# Patient Record
Sex: FEMALE | Race: BLACK OR AFRICAN AMERICAN | NOT HISPANIC OR LATINO | Employment: UNEMPLOYED | URBAN - METROPOLITAN AREA
[De-identification: names, ages, dates, MRNs, and addresses within clinical notes are randomized per-mention and may not be internally consistent; named-entity substitution may affect disease eponyms.]

---

## 2018-05-30 ENCOUNTER — TELEPHONE (OUTPATIENT)
Dept: DERMATOLOGY | Facility: CLINIC | Age: 43
End: 2018-05-30

## 2018-09-26 ENCOUNTER — TELEPHONE (OUTPATIENT)
Dept: DERMATOLOGY | Facility: CLINIC | Age: 43
End: 2018-09-26

## 2018-09-26 NOTE — TELEPHONE ENCOUNTER
Spoke with pt and rescheduled her appointment with Dr. Cates since Dr. thompson is out on maternity leave.

## 2018-09-26 NOTE — TELEPHONE ENCOUNTER
----- Message from Stephanie Deandre sent at 9/26/2018 11:23 AM CDT -----  Contact: pt at [phone]  Trey pt-pt is calling to rescheduled  Her appt..  Can be reached at above.

## 2018-10-25 ENCOUNTER — OFFICE VISIT (OUTPATIENT)
Dept: DERMATOLOGY | Facility: CLINIC | Age: 43
End: 2018-10-25
Payer: COMMERCIAL

## 2018-10-25 ENCOUNTER — LAB VISIT (OUTPATIENT)
Dept: LAB | Facility: HOSPITAL | Age: 43
End: 2018-10-25
Payer: COMMERCIAL

## 2018-10-25 DIAGNOSIS — Z85.72 HISTORY OF CUTANEOUS T-CELL LYMPHOMA: ICD-10-CM

## 2018-10-25 DIAGNOSIS — R21 RASH AND NONSPECIFIC SKIN ERUPTION: Primary | ICD-10-CM

## 2018-10-25 DIAGNOSIS — E04.9 GOITER: ICD-10-CM

## 2018-10-25 LAB
ALBUMIN SERPL BCP-MCNC: 3.6 G/DL
ALP SERPL-CCNC: 78 U/L
ALT SERPL W/O P-5'-P-CCNC: 7 U/L
ANION GAP SERPL CALC-SCNC: 5 MMOL/L
AST SERPL-CCNC: 13 U/L
BASOPHILS # BLD AUTO: 0.08 K/UL
BASOPHILS NFR BLD: 1.1 %
BILIRUB SERPL-MCNC: 0.5 MG/DL
BUN SERPL-MCNC: 13 MG/DL
CALCIUM SERPL-MCNC: 9.2 MG/DL
CHLORIDE SERPL-SCNC: 106 MMOL/L
CHOLEST SERPL-MCNC: 142 MG/DL
CHOLEST/HDLC SERPL: 3.6 {RATIO}
CO2 SERPL-SCNC: 28 MMOL/L
CREAT SERPL-MCNC: 0.9 MG/DL
DIFFERENTIAL METHOD: ABNORMAL
EOSINOPHIL # BLD AUTO: 0.3 K/UL
EOSINOPHIL NFR BLD: 4.5 %
ERYTHROCYTE [DISTWIDTH] IN BLOOD BY AUTOMATED COUNT: 13.1 %
EST. GFR  (AFRICAN AMERICAN): >60 ML/MIN/1.73 M^2
EST. GFR  (NON AFRICAN AMERICAN): >60 ML/MIN/1.73 M^2
GLUCOSE SERPL-MCNC: 94 MG/DL
HCT VFR BLD AUTO: 39 %
HDLC SERPL-MCNC: 39 MG/DL
HDLC SERPL: 27.5 %
HGB BLD-MCNC: 12.4 G/DL
IMM GRANULOCYTES # BLD AUTO: 0.03 K/UL
IMM GRANULOCYTES NFR BLD AUTO: 0.4 %
LDH SERPL L TO P-CCNC: 150 U/L
LDLC SERPL CALC-MCNC: 95.2 MG/DL
LYMPHOCYTES # BLD AUTO: 1.8 K/UL
LYMPHOCYTES NFR BLD: 24.2 %
MCH RBC QN AUTO: 30.7 PG
MCHC RBC AUTO-ENTMCNC: 31.8 G/DL
MCV RBC AUTO: 97 FL
MONOCYTES # BLD AUTO: 0.6 K/UL
MONOCYTES NFR BLD: 8.5 %
NEUTROPHILS # BLD AUTO: 4.5 K/UL
NEUTROPHILS NFR BLD: 61.3 %
NONHDLC SERPL-MCNC: 103 MG/DL
NRBC BLD-RTO: 0 /100 WBC
PLATELET # BLD AUTO: 311 K/UL
PMV BLD AUTO: 11.1 FL
POTASSIUM SERPL-SCNC: 4.3 MMOL/L
PROT SERPL-MCNC: 7.1 G/DL
RBC # BLD AUTO: 4.04 M/UL
SODIUM SERPL-SCNC: 139 MMOL/L
TRIGL SERPL-MCNC: 39 MG/DL
TSH SERPL DL<=0.005 MIU/L-ACNC: 0.66 UIU/ML
WBC # BLD AUTO: 7.28 K/UL

## 2018-10-25 PROCEDURE — 36415 COLL VENOUS BLD VENIPUNCTURE: CPT

## 2018-10-25 PROCEDURE — 88312 SPECIAL STAINS GROUP 1: CPT | Performed by: PATHOLOGY

## 2018-10-25 PROCEDURE — 99203 OFFICE O/P NEW LOW 30 MIN: CPT | Mod: 25,S$GLB,, | Performed by: PATHOLOGY

## 2018-10-25 PROCEDURE — 85009 MANUAL DIFF WBC COUNT B-COAT: CPT

## 2018-10-25 PROCEDURE — 83615 LACTATE (LD) (LDH) ENZYME: CPT

## 2018-10-25 PROCEDURE — 11100 PR BIOPSY OF SKIN LESION: CPT | Mod: S$GLB,,, | Performed by: PATHOLOGY

## 2018-10-25 PROCEDURE — 80053 COMPREHEN METABOLIC PANEL: CPT

## 2018-10-25 PROCEDURE — 85025 COMPLETE CBC W/AUTO DIFF WBC: CPT

## 2018-10-25 PROCEDURE — 86687 HTLV-I ANTIBODY: CPT

## 2018-10-25 PROCEDURE — 99999 PR PBB SHADOW E&M-EST. PATIENT-LVL III: CPT | Mod: PBBFAC,,, | Performed by: PATHOLOGY

## 2018-10-25 PROCEDURE — 88341 IMHCHEM/IMCYTCHM EA ADD ANTB: CPT | Mod: 26,,, | Performed by: PATHOLOGY

## 2018-10-25 PROCEDURE — 86592 SYPHILIS TEST NON-TREP QUAL: CPT

## 2018-10-25 PROCEDURE — 83520 IMMUNOASSAY QUANT NOS NONAB: CPT

## 2018-10-25 PROCEDURE — 84443 ASSAY THYROID STIM HORMONE: CPT

## 2018-10-25 PROCEDURE — 88305 TISSUE EXAM BY PATHOLOGIST: CPT | Mod: 26,,, | Performed by: PATHOLOGY

## 2018-10-25 PROCEDURE — 88305 TISSUE EXAM BY PATHOLOGIST: CPT | Performed by: PATHOLOGY

## 2018-10-25 PROCEDURE — 88312 SPECIAL STAINS GROUP 1: CPT | Mod: 26,,, | Performed by: PATHOLOGY

## 2018-10-25 PROCEDURE — 80061 LIPID PANEL: CPT

## 2018-10-25 PROCEDURE — 88342 IMHCHEM/IMCYTCHM 1ST ANTB: CPT | Mod: 26,,, | Performed by: PATHOLOGY

## 2018-10-25 NOTE — PROGRESS NOTES
"  Subjective:       Patient ID:  Jina Castro is a 43 y.o. female who presents for   Chief Complaint   Patient presents with    Spot     Back, x 1 year, spreading, no treatment      Spot  - Initial  Affected locations: back  Duration: 1 year  Signs / symptoms: spreading  Aggravated by: nothing  Relieving factors/Treatments tried: nothing    Patient with h/o "parasporiasis" since the age of 10.  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  Treated by Dr. McBurney in the past, but has not seen her in several years.  Cleared with nbUVB in 2015, but job/time commitments made continued light tx difficult to continue at the time.  Stayed clear for at least a year.  Now getting return of erythematous, scaly patches and plaques in areas that had formerly cleared with hypopigmentation.  No associated pruritus.  Interested in resuming light treatments now that she starts her job at noon.  Has not had any imaging, blood flow cytometry, labs relevant to CTCL in >3 years.  Has a multinodular goiter that has been biopsied in the past - benign.  Stable in size per patient.  She is interested in getting established with [facility] for this.    Review of Systems   Constitutional: Negative for fever, chills, weight loss, weight gain, fatigue, night sweats and malaise.   Gastrointestinal: Negative for abdominal pain.   Skin: Positive for rash. Negative for itching, daily sunscreen use, activity-related sunscreen use and recent sunburn.   Hematologic/Lymphatic: Does not bruise/bleed easily.        Objective:    Physical Exam   Constitutional: She appears well-developed and well-nourished.   Neurological: She is alert.   Skin:   Areas Examined (abnormalities noted in diagram):   Scalp / Hair Palpated and Inspected  Head / Face Inspection Performed  Neck Inspection Performed  Chest / Axilla Inspection Performed  Abdomen Inspection " Performed  Genitals / Buttocks / Groin Inspection Performed  Back Inspection Performed  RUE Inspected  LUE Inspection Performed  RLE Inspected  LLE Inspection Performed  Nails and Digits Inspection Performed  Gland Inspection Performed              Diagram Legend     Erythematous scaling macule/papule c/w actinic keratosis       Vascular papule c/w angioma      Pigmented verrucoid papule/plaque c/w seborrheic keratosis      Yellow umbilicated papule c/w sebaceous hyperplasia      Irregularly shaped tan macule c/w lentigo     1-2 mm smooth white papules consistent with Milia      Movable subcutaneous cyst with punctum c/w epidermal inclusion cyst      Subcutaneous movable cyst c/w pilar cyst      Firm pink to brown papule c/w dermatofibroma      Pedunculated fleshy papule(s) c/w skin tag(s)      Evenly pigmented macule c/w junctional nevus     Mildly variegated pigmented, slightly irregular-bordered macule c/w mildly atypical nevus      Flesh colored to evenly pigmented papule c/w intradermal nevus       Pink pearly papule/plaque c/w basal cell carcinoma      Erythematous hyperkeratotic cursted plaque c/w SCC      Surgical scar with no sign of skin cancer recurrence      Open and closed comedones      Inflammatory papules and pustules      Verrucoid papule consistent consistent with wart     Erythematous eczematous patches and plaques     Dystrophic onycholytic nail with subungual debris c/w onychomycosis     Umbilicated papule    Erythematous-base heme-crusted tan verrucoid plaque consistent with inflamed seborrheic keratosis     Erythematous Silvery Scaling Plaque c/w Psoriasis     See annotation      Assessment / Plan:      Pathology Orders:     Normal Orders This Visit    Tissue Specimen To Pathology, Dermatology     Questions:    Directional Terms:  Other(comment)    Right    Clinical information:  h/o CTCL with recurring pathches and plaques; CTCL, r/o large cell transformation; please do TCR gene rearrangement     Specific Site:  back        Rash and nonspecific skin eruption  -     Tissue Specimen To Pathology, Dermatology    Punch biopsy procedure note:  Punch biopsy performed after verbal consent obtained. Area marked and prepped with alcohol. Approximately 1cc of 1% lidocaine with epinephrine injected. 4 mm disposable punch used to remove lesion. Hemostasis obtained and biopsy site closed with 1 - 2 Prolene sutures. Wound care instructions reviewed with patient and handout given.      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  Wants to re-start this.  Has not have labs or imaging for this in >3 years.  Has not has recent biopsy to r/o large cell transformation.  Will order nbUVB.  Pending biopsy, labs, imaging will consider combination systemic tx such as soriatane, targretin.  -     CBC with auto differential and platelets; Future  -     CMP; Future  -     TSH; Future  -     RPR; Future  -     LDH; Future  -     Lipid panel; Future  -     IL - 2 receptor; Future  -     HTLV I/II Antibody; Future  -     Sezary Prep; Future  -     T-Cell Receptor Gene Rerrangement; Future  -     CT Chest Abdomen Pelvis W W/O Contrast (XPD)    Goiter  -     Ambulatory consult to Endocrinology             Follow-up in about 2 weeks (around 11/8/2018) for suture removal.

## 2018-10-25 NOTE — PATIENT INSTRUCTIONS
"Punch Biopsy Wound Care    Your doctor has performed a punch biopsy today.  A band aid and antibiotic ointment has been placed over the site.  This should remain in place for 24 hours.  It is recommended that you keep the area dry for the first 24 hours.  After 24 hours, you may remove the band aid and wash the area with warm soap and water and apply Vaseline jelly.  Many patients prefer to use Neosporin or Bacitracin ointment.  This is acceptable; however know that you can develop an allergy to this medication even if you have used it safely for years.  It is important to keep the area moist.  Letting it dry out and get air slows healing time, will worsen the scar, and make it more difficult to remove the stitches if they were placed.  Band aid is optional after first 24 hours.      If you notice increasing redness, tenderness, pain, or yellow drainage at the biopsy or surgical site, please notify your doctor.  These are signs of an infection.    If your biopsy/surgical site is bleeding, apply firm pressure for 15 minutes straight.  Repeat for another 15 minutes, if it is still bleeding.   If the surgical site continues to bleed, then please contact your doctor.      For [facility] users:   You will receive a [facility] notification after the pathologist has finished reviewing your biopsy specimen. Pathology results, however, will not be released online so you will see a "no content" message. Once your dermatologist reviews and clinically correlates your biopsy results, you will either receive a letter in the mail with the results of a phone call from your doctor's office if further explanation or treatment is warranted.       [address] [place] [address]/ [phone] FAX/ [email or web address]         "

## 2018-10-26 LAB
PATH REV BLD -IMP: NORMAL
PATH REV: NORMAL
RPR SER QL: NORMAL

## 2018-10-27 ENCOUNTER — HOSPITAL ENCOUNTER (OUTPATIENT)
Dept: RADIOLOGY | Facility: HOSPITAL | Age: 43
Discharge: HOME | End: 2018-10-27
Attending: PATHOLOGY
Payer: COMMERCIAL

## 2018-10-27 LAB — INTERLEUKIN 2 (IL-2) RECEPTOR: 520 PG/ML

## 2018-10-27 PROCEDURE — 74177 CT ABD & PELVIS W/CONTRAST: CPT | Mod: TC

## 2018-10-27 PROCEDURE — 25500020 PHARM REV CODE 255: Performed by: PATHOLOGY

## 2018-10-27 PROCEDURE — 71260 CT THORAX DX C+: CPT | Mod: 26,,, | Performed by: RADIOLOGY

## 2018-10-27 PROCEDURE — 74177 CT ABD & PELVIS W/CONTRAST: CPT | Mod: 26,,, | Performed by: RADIOLOGY

## 2018-10-27 RX ADMIN — IOHEXOL 75 ML: 350 INJECTION, SOLUTION INTRAVENOUS at 08:10

## 2018-10-29 LAB — HTLV I/II ANTIBODY, DONOR EVAL (BLOOD EVAL): NORMAL

## 2018-11-02 DIAGNOSIS — E07.9 THYROID MASS: ICD-10-CM

## 2018-11-02 DIAGNOSIS — C84.A0 CUTANEOUS T-CELL LYMPHOMA, UNSPECIFIED BODY REGION: Primary | ICD-10-CM

## 2018-11-05 ENCOUNTER — TELEPHONE (OUTPATIENT)
Dept: DERMATOLOGY | Facility: CLINIC | Age: 43
End: 2018-11-05

## 2018-11-08 ENCOUNTER — OFFICE VISIT (OUTPATIENT)
Dept: DERMATOLOGY | Facility: CLINIC | Age: 43
End: 2018-11-08
Payer: COMMERCIAL

## 2018-11-08 ENCOUNTER — LAB VISIT (OUTPATIENT)
Dept: LAB | Facility: HOSPITAL | Age: 43
End: 2018-11-08
Payer: COMMERCIAL

## 2018-11-08 DIAGNOSIS — C84.A0 CUTANEOUS T-CELL LYMPHOMA, UNSPECIFIED BODY REGION: ICD-10-CM

## 2018-11-08 DIAGNOSIS — C84.A0 CUTANEOUS T-CELL LYMPHOMA, UNSPECIFIED BODY REGION: Primary | ICD-10-CM

## 2018-11-08 PROCEDURE — 88185 FLOWCYTOMETRY/TC ADD-ON: CPT | Performed by: PATHOLOGY

## 2018-11-08 PROCEDURE — 88189 FLOWCYTOMETRY/READ 16 & >: CPT | Mod: ,,, | Performed by: PATHOLOGY

## 2018-11-08 PROCEDURE — 99212 OFFICE O/P EST SF 10 MIN: CPT | Mod: S$GLB,,, | Performed by: PATHOLOGY

## 2018-11-08 PROCEDURE — 88184 FLOWCYTOMETRY/ TC 1 MARKER: CPT | Performed by: PATHOLOGY

## 2018-11-08 NOTE — PROGRESS NOTES
"  Subjective:       Patient ID:  Jina Castro is a 43 y.o. female who presents for No chief complaint on file.    HPI  Pt here today for suture removal/biopsy results:    FINAL PATHOLOGIC DIAGNOSIS  Skin, right back, punch biopsy:  -CUTANEOUS T-CELL LYMPHOMA, consistent with  COMMENT: The clinical history along with the histological and immunohistochemical features seen are consistent  with cutaneous T-cell lymphoma. Areas of enlarged lymphocytes or large cell transformation are not seen. T-cell  gene rearrangement studies been ordered and will be reported in an addendum to follow.  Diagnosed by: Martin Almaguer  (Electronically Signed: 2018-10-31 13:45:31)  Microscopic Examination  Punch biopsy sections show a superficial perivascular and interstitial lymphohistiocytic infiltrate with admixed  melanophages. Very rare eosinophils are present. The lymphocytes overall appear small to medium in size with focal  nuclear irregularities. Many of the lymphocytes show exocytosis in a mildly spongiotic epidermis. CD3 highlights the  numerous lymphocytes present within the epidermis which extend to the mid levels of the epidermis. Many  lymphocytes are also noted along the dermoepidermal junction. The lymphocytes in the superficial dermis show CD4  to CD8 ratio of at least 10:1. The majority of the intraepidermal lymphocytes are positive for CD8. There is loss of  CD7. PAS stain is negative for fungal organisms. Stains were reviewed in conjunction with adequate positive controls.  This case was also reviewed by Dr. Allen Cates who agrees with the above diagnosis.    PMH: Patient with h/o "parasporiasis" since the age of 10.  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  Treated by Dr. McBurney in the past, but has not seen her in several years.  Cleared with nbUVB in 2015, but job/time commitments made continued light tx difficult to " continue at the time.  Stayed clear for at least a year.  Now getting return of erythematous, scaly patches and plaques in areas that had formerly cleared with hypopigmentation.  No associated pruritus.  Interested in resuming light treatments now that she starts her job at noon.  Has not had any imaging, blood flow cytometry, labs relevant to CTCL in >3 years.  Has a multinodular goiter that has been biopsied in the past - benign.  Stable in size per patient.  She is interested in getting established with [facility] for this.    CT showed:  Impression       1. Mild bilateral axillary and inguinal adenopathy.  2. Markedly enlarged heterogeneous thyroid extending below the sternoclavicular junction.  Recommend additional evaluation with thyroid ultrasound, as clinically indicated.  This report was flagged in Epic as abnormal.     Sezary prep:  Buffy coat from blood sample submitted for flow cytometric analysis   is reviewed. Granulocytes are morphologically unremarkable.  Small   mature lymphocytes are seen.  Scattered atypical mononuclear cells   are present.  Correlation with flow cytometric immunophenotyping is   required to further characterize the lymphocyte population.  Red   blood cells are morphologically unremarkable.  Platelets display   occasional large forms.     HTLVI/II Ab negative, RPR negative, LDH WNL, CBC and CMP WNL, IL-2 receptor and lipid panel unremarkable.    Will need flow cytometry on blood, TCR gene rearrangement on blood.  Put in Heme/Onc consult to evaluate adenopathy for possible sara involvement.      Review of Systems   Constitutional: Negative for fever, chills, weight loss, fatigue and malaise.   Skin: Positive for itching and rash.        Objective:    Physical Exam   Constitutional: She appears well-developed and well-nourished.   Neurological: She is alert.   Skin:   Areas Examined (abnormalities noted in diagram):   Scalp / Hair Palpated and Inspected  Head / Face  Inspection Performed  Neck Inspection Performed  Chest / Axilla Inspection Performed  Abdomen Inspection Performed  Genitals / Buttocks / Groin Inspection Performed  Back Inspection Performed  RUE Inspected  LUE Inspection Performed  RLE Inspected  LLE Inspection Performed  Nails and Digits Inspection Performed  Gland Inspection Performed              Diagram Legend     Erythematous scaling macule/papule c/w actinic keratosis       Vascular papule c/w angioma      Pigmented verrucoid papule/plaque c/w seborrheic keratosis      Yellow umbilicated papule c/w sebaceous hyperplasia      Irregularly shaped tan macule c/w lentigo     1-2 mm smooth white papules consistent with Milia      Movable subcutaneous cyst with punctum c/w epidermal inclusion cyst      Subcutaneous movable cyst c/w pilar cyst      Firm pink to brown papule c/w dermatofibroma      Pedunculated fleshy papule(s) c/w skin tag(s)      Evenly pigmented macule c/w junctional nevus     Mildly variegated pigmented, slightly irregular-bordered macule c/w mildly atypical nevus      Flesh colored to evenly pigmented papule c/w intradermal nevus       Pink pearly papule/plaque c/w basal cell carcinoma      Erythematous hyperkeratotic cursted plaque c/w SCC      Surgical scar with no sign of skin cancer recurrence      Open and closed comedones      Inflammatory papules and pustules      Verrucoid papule consistent consistent with wart     Erythematous eczematous patches and plaques     Dystrophic onycholytic nail with subungual debris c/w onychomycosis     Umbilicated papule    Erythematous-base heme-crusted tan verrucoid plaque consistent with inflamed seborrheic keratosis     Erythematous Silvery Scaling Plaque c/w Psoriasis     See annotation      Assessment / Plan:        Cutaneous T-cell lymphoma, unspecified body region  -     Flow Cytometry Analysis (Peripheral Blood); Future  - TCR gene rearrangement analysis on blood today (ordered last visit but not  performed)  - will await Heme/Onc evaluation of adenopathy/ possible FNA/biopsy to rule out sara involvement  - depending on above results, will pursue nbUVB vs systemic tx; suggest pt re-establish care with Dr. McBurney           No Follow-up on file.

## 2018-11-12 ENCOUNTER — TELEPHONE (OUTPATIENT)
Dept: HEMATOLOGY/ONCOLOGY | Facility: CLINIC | Age: 43
End: 2018-11-12

## 2018-11-12 LAB
FLOW CYTOMETRY ANTIBODIES ANALYZED - BLOOD: NORMAL
FLOW CYTOMETRY COMMENT - BLOOD: NORMAL
FLOW CYTOMETRY INTERPRETATION - BLOOD: NORMAL

## 2018-11-12 NOTE — TELEPHONE ENCOUNTER
"Onc referral for "evaluation of adenopathy to rule out sara involvement".  Scheduled for this Friday.  Pt unclear about having to see H&N surg onc for thyroid mass.  Will clarify with Dr Cates.    ==============================  ----- Message -----  From: Ebony Pa  Sent: 11/8/2018  10:43 AM  To: [facility]    Needs Advice    Reason for call: Would like to schedule appt for Cutaneous T-cell lymphoma, unspecified body region, being referred by Dr. Cates with referral in the system. 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         Communication Preference: [phone]     Additional Information:      "

## 2018-11-13 ENCOUNTER — CLINICAL SUPPORT (OUTPATIENT)
Dept: DERMATOLOGY | Facility: CLINIC | Age: 43
End: 2018-11-13
Payer: COMMERCIAL

## 2018-11-13 DIAGNOSIS — Z85.72 HISTORY OF CUTANEOUS T-CELL LYMPHOMA: ICD-10-CM

## 2018-11-13 PROCEDURE — 96900 ACTINOTHERAPY UV LIGHT: CPT | Mod: S$GLB,,, | Performed by: DERMATOLOGY

## 2018-11-14 ENCOUNTER — TELEPHONE (OUTPATIENT)
Dept: DERMATOLOGY | Facility: CLINIC | Age: 43
End: 2018-11-14

## 2018-11-14 NOTE — TELEPHONE ENCOUNTER
----- Message from Annalisa Paredes sent at 11/14/2018  9:53 AM CST -----  Contact: pt   BV- pt - Patient Returning Call from [facility]    Who Left Message for Patient: Danyell   Communication Preference: pt   Additional Information: can you please call pt at [phone]    ZACH

## 2018-11-16 ENCOUNTER — CLINICAL SUPPORT (OUTPATIENT)
Dept: DERMATOLOGY | Facility: CLINIC | Age: 43
End: 2018-11-16
Payer: COMMERCIAL

## 2018-11-16 ENCOUNTER — INITIAL CONSULT (OUTPATIENT)
Dept: HEMATOLOGY/ONCOLOGY | Facility: CLINIC | Age: 43
End: 2018-11-16
Payer: COMMERCIAL

## 2018-11-16 VITALS
SYSTOLIC BLOOD PRESSURE: 111 MMHG | WEIGHT: 168.88 LBS | TEMPERATURE: 98 F | OXYGEN SATURATION: 100 % | HEART RATE: 89 BPM | DIASTOLIC BLOOD PRESSURE: 75 MMHG | BODY MASS INDEX: 31.08 KG/M2 | HEIGHT: 62 IN | RESPIRATION RATE: 18 BRPM

## 2018-11-16 DIAGNOSIS — C84.A8 CUTANEOUS T-CELL LYMPHOMA INVOLVING LYMPH NODES OF MULTIPLE REGIONS: ICD-10-CM

## 2018-11-16 DIAGNOSIS — C84.A4 CUTANEOUS T-CELL LYMPHOMA INVOLVING LYMPH NODES OF UPPER EXTREMITY: Primary | ICD-10-CM

## 2018-11-16 DIAGNOSIS — Z85.72 HISTORY OF CUTANEOUS T-CELL LYMPHOMA: ICD-10-CM

## 2018-11-16 PROCEDURE — 96900 ACTINOTHERAPY UV LIGHT: CPT | Mod: S$GLB,,, | Performed by: DERMATOLOGY

## 2018-11-16 PROCEDURE — 99999 PR PBB SHADOW E&M-EST. PATIENT-LVL III: CPT | Mod: PBBFAC,,, | Performed by: INTERNAL MEDICINE

## 2018-11-16 PROCEDURE — 99245 OFF/OP CONSLTJ NEW/EST HI 55: CPT | Mod: S$GLB,,, | Performed by: INTERNAL MEDICINE

## 2018-11-16 NOTE — PROGRESS NOTES
"Hematology and Medical Oncology   New Patient Consult     11/16/2018  Referred by:  Dr. Allen Cates    Primary Oncologic Diagnosis:CTCL    History of Present Ilness:   Jina Castro (Jina) is a pleasant 43 y.o.female with a prolonged course of active skin CTCL. She presents today to re-establish care for her CTCL. She was previously followed at [facility], until they became out of network for her insurance.  She has been on and off PUVA therapy as her time constraints allowed.  About 1 year ago she noticed recurrance of cutaneous lesions oh her upper extremity and trunk.  Several lesion seen at the sites of tatoos with notable hypopigmentation. The lesions do not itch and they are not painful. This week she restarted twice weekly PUVA.    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 McBurney in the past, but has not seen her in several years.  Cleared with nbUVB in 2015, but job/time commitments made continued light tx difficult to continue at the time.  Stayed clear for at least a year.    --CT Chesr/Abd/Pelvis: Mildly enlarged bilateral axillary lymph nodes with abnormal morphology measuring up to 0.9 cm on the right (series 2, image 35) and 0.9 cm on the left (series 2, image 28).        PAST MEDICAL HISTORY:   History reviewed. No pertinent past medical history.    PAST SURGICAL HISTORY:   History reviewed. No pertinent surgical history.    PAST SOCIAL HISTORY:   reports that  has never smoked. She does not have any smokeless tobacco history on file.    FAMILY HISTORY:  Family History   Problem Relation Age of Onset    Melanoma Neg Hx     Psoriasis Neg Hx     Lupus Neg Hx        CURRENT MEDICATIONS:   No current outpatient medications on file.     No current facility-administered medications for this visit.  "     ALLERGIES:   Review of patient's allergies indicates:  No Known Allergies      Review of Systems:     Review of Systems   Constitutional: Negative for appetite change, chills, diaphoresis, fatigue, fever and unexpected weight change.   HENT:   Negative for hearing loss, mouth sores, nosebleeds, sore throat, trouble swallowing and voice change.    Eyes: Negative for eye problems and icterus.   Respiratory: Negative for chest tightness, cough, hemoptysis, shortness of breath and wheezing.    Cardiovascular: Negative for chest pain, leg swelling and palpitations.   Gastrointestinal: Negative for abdominal distention, abdominal pain, blood in stool, diarrhea, nausea and vomiting.   Endocrine: Negative for hot flashes.   Genitourinary: Negative for bladder incontinence, difficulty urinating, dysuria and hematuria.    Musculoskeletal: Negative for arthralgias, back pain, flank pain, gait problem, myalgias, neck pain and neck stiffness.   Skin: Positive for rash. Negative for itching and wound.        Numerous large hypopigmented plaques on upper extremity and trunk   Neurological: Negative for dizziness, extremity weakness, gait problem, headaches, numbness, seizures and speech difficulty.   Hematological: Negative for adenopathy. Does not bruise/bleed easily.   Psychiatric/Behavioral: Negative for confusion, depression and sleep disturbance. The patient is not nervous/anxious.           Physical Exam:     Vitals:    11/16/18 0805   BP: 111/75   Pulse: 89   Resp: 18   Temp: 98.3 °F (36.8 °C)     Physical Exam   Constitutional: She is oriented to person, place, and time. She appears well-developed and well-nourished. No distress.   HENT:   Head: Normocephalic and atraumatic.   Mouth/Throat: Oropharynx is clear and moist. No oropharyngeal exudate.   Eyes: Conjunctivae and EOM are normal. Pupils are equal, round, and reactive to light.   Neck: Normal range of motion. Neck supple. No JVD present. No tracheal deviation  present. No thyromegaly present.   Cardiovascular: Normal rate, regular rhythm and normal heart sounds. Exam reveals no friction rub.   No murmur heard.  Pulmonary/Chest: Effort normal and breath sounds normal. No stridor. No respiratory distress. She has no wheezes. She has no rales. She exhibits no tenderness.   Abdominal: Soft. Bowel sounds are normal. She exhibits no distension. There is no tenderness. There is no rebound and no guarding.   Musculoskeletal: Normal range of motion. She exhibits no edema, tenderness or deformity.   Lymphadenopathy:     She has no axillary adenopathy.   Neurological: She is alert and oriented to person, place, and time. She displays normal reflexes. No cranial nerve deficit or sensory deficit. She exhibits normal muscle tone. Coordination normal.   Skin: Skin is warm and dry. Capillary refill takes less than 2 seconds. No rash noted. She is not diaphoretic. No erythema. No pallor.        Psychiatric: She has a normal mood and affect. Her behavior is normal. Judgment and thought content normal.       ECOG Performance Status: (foot note - ECOG PS provided by Eastern Cooperative Oncology Group) 0 - Asymptomatic    Karnofsky Performance Score:  100%- Normal, No Complaints, No Evidence of Disease    Labs:   Lab Results   Component Value Date    WBC 7.28 10/25/2018    HGB 12.4 10/25/2018    HCT 39.0 10/25/2018     10/25/2018    CHOL 142 10/25/2018    TRIG 39 10/25/2018    HDL 39 (L) 10/25/2018    ALT 7 (L) 10/25/2018    AST 13 10/25/2018     10/25/2018    K 4.3 10/25/2018     10/25/2018    CREATININE 0.9 10/25/2018    BUN 13 10/25/2018    CO2 28 10/25/2018    TSH 0.661 10/25/2018         Imaging: Previous imaging has been reviewed   Assessment and Plan:     Ms. Castro is a 43 year old female who is here today to discuss possible treatment options for her CTCL    CTCL  --Most recent biopsy confirmed relapse, flow is negative for blood involvement  --T cell gene  re-arrangment is positive  --Multiple new large plagues are now being treated with PUVA [first treatment was on Tuesday]. We know that the likelihood of developing extracutaneous disease, exclusive of peripheral blood involvement, correlates with the extent of skin involvement. It is exceedingly rare among patients with limited patch or plaque disease, relatively uncommon among those with generalized plaque (8 percent), and most likely among patients with tumors or generalized erythroderma (30 to 42 percent)  --Final staging is pending PET CT and is based upon the involvement of the skin (T), lymph nodes (N), viscera (M), and blood (B)   --CT chest/abd/pelvis was suspicious for bilateral axial involvement given the architecture of the nodes  --Possible excisional biopsy to be determined based on PET  --If PET is negative it is okay to treat topically. If there is sara involvement we will discuss single agent therapy such as brentuximab    60 minutes were spent face to face with the patient and her  family to discuss the disease, natural history, treatment options and survival statistics. I have provided the patient with an opportunity to ask questions and have all questions answered to his satisfaction.       she will return to clinic following PET, but knows to call in the interim if symptoms change or should a problem arise.        Savanna Herrera MD  Hematology and Medical Oncology  [facility]  [facility]

## 2018-11-19 ENCOUNTER — OFFICE VISIT (OUTPATIENT)
Dept: OTOLARYNGOLOGY | Facility: CLINIC | Age: 43
End: 2018-11-19
Payer: COMMERCIAL

## 2018-11-19 VITALS
HEART RATE: 87 BPM | SYSTOLIC BLOOD PRESSURE: 105 MMHG | DIASTOLIC BLOOD PRESSURE: 71 MMHG | BODY MASS INDEX: 31.25 KG/M2 | WEIGHT: 170.88 LBS

## 2018-11-19 DIAGNOSIS — E04.2 MULTINODULAR GOITER: Primary | ICD-10-CM

## 2018-11-19 PROBLEM — C84.A8 CUTANEOUS T-CELL LYMPHOMA INVOLVING LYMPH NODES OF MULTIPLE REGIONS: Status: ACTIVE | Noted: 2018-11-19

## 2018-11-19 PROCEDURE — 99204 OFFICE O/P NEW MOD 45 MIN: CPT | Mod: S$GLB,,, | Performed by: OTOLARYNGOLOGY

## 2018-11-19 PROCEDURE — 99999 PR PBB SHADOW E&M-EST. PATIENT-LVL III: CPT | Mod: PBBFAC,,, | Performed by: OTOLARYNGOLOGY

## 2018-11-19 PROCEDURE — 3008F BODY MASS INDEX DOCD: CPT | Mod: CPTII,S$GLB,, | Performed by: OTOLARYNGOLOGY

## 2018-11-19 NOTE — PROGRESS NOTES
Chief Complaint   Patient presents with    Consult     thyroid mass         43 y.o. female presents with longstanding history of thyroid goiter. She was seen in 2016 at [facility] where she underwent an ultrasound and biopsy which demonstrated benign thyroid nodules. She denies voice change, orthopenea or dysphagia. It does not bother her, and she is not interested in surgery unless necessary at this time.    TSH 10/25/18: 0.661       Review of Systems     Constitutional: Negative for fatigue and unexpected weight change.   HENT: per HPI.  Eyes: Negative for visual disturbance.   Respiratory: Negative for shortness of breath, hemoptysis  Cardiovascular: Negative for chest pain and palpitations.   Genitourinary: Negative for dysuria and difficulty urinating.   Musculoskeletal: Negative for decreased ROM, back pain.   Skin: Negative for rash, sunburn, itching.   Neurological: Negative for dizziness and seizures.   Hematological: Negative for adenopathy. Does not bruise/bleed easily.   Psychiatric/Behavioral: Negative for agitation. The patient is not nervous/anxious.   Endocrine: Negative for rapid weight loss/weight gain, heat/cold intolerance.     History reviewed. No pertinent past medical history.    History reviewed. No pertinent surgical history.    family history is not on file.    Pt  reports that  has never smoked. She does not have any smokeless tobacco history on file.    Review of patient's allergies indicates:  No Known Allergies     Physical Exam    Vitals:    11/19/18 0905   BP: 105/71   Pulse: 87     Body mass index is 31.25 kg/m².    Physical Exam   Constitutional: She is oriented to person, place, and time. She appears well-developed and well-nourished. No distress.   HENT:   Head: Normocephalic and atraumatic.   Right Ear: Hearing, tympanic membrane, external ear and ear canal normal. Tympanic membrane mobility is normal. No middle ear effusion. No decreased hearing is noted.   Left Ear: Hearing, tympanic  membrane, external ear and ear canal normal. Tympanic membrane mobility is normal.  No middle ear effusion. No decreased hearing is noted.   Nose: Nose normal.   Mouth/Throat: Oropharynx is clear and moist.   Eyes: Conjunctivae, EOM and lids are normal. Pupils are equal, round, and reactive to light. Right eye exhibits no discharge. Left eye exhibits no discharge.   Neck: Trachea normal, normal range of motion and phonation normal. Neck supple. No tracheal tenderness present. No tracheal deviation, no edema and no erythema present. Thyromegaly present.   Cardiovascular: Normal heart sounds.   Pulmonary/Chest: Breath sounds normal. No stridor.   Abdominal: Soft.   Lymphadenopathy:     She has no cervical adenopathy.   Neurological: She is alert and oriented to person, place, and time.   Skin: Skin is warm and dry. No rash noted. She is not diaphoretic. No erythema. No pallor.   Psychiatric: She has a normal mood and affect.   Nursing note and vitals reviewed.    Studies reviewed:  US Neck 5/18/16:  Findings  The thyroid is again noted to be enlarged heterogeneous with  appearance compatible with multinodular goiter, grossly unchanged  from 2009. Numerous nodules are again noted, for reference:  Hyperechoic circumscribed nodule in the isthmus with purely cystic  components measures 1.7 x 1.4 centimeter. Circumscribed hyperechoic  nodule in the right upper pole measures 1 x 0.7 cm. Additional  similar-appearing right upper lobe nodule measures 1.1 x 1.1 cm. The  dominant predominantly hyperechoic nodule which was previously  biopsied and shown to be benign in the right lower pole measures 4-5  centimeters and demonstrates peripheral and central vascularity,  grossly unchanged as remeasured. Similar-appearing nodule adjacent to  the isthmus in the left interpolar region measures 3.7 x 2.9  centimeters containing scattered cystic changes. Additional abutting  nodule in the left interpolar region which shows greater than  50  percent cystic composition measures 4-5 cm. Additional  similar-appearing nodule more inferiorly in the lower pole on the  left measures 3.1 x 2 cm. All nodules show a thin hypoechoic halo and  are unchanged as reevaluated on prior ultrasound of 2009.    The RIGHT LOBE measures 7 x 3.1 x 2.7 cm.  The LEFT LOBE measures 6.4 x 3.4 x 3.1 cm.  The ISTHMUS measures 12 mm.    No lymphadenopathy is noted.     FNA thyroid 04/16:  These findings are consistent with a benign  thyroid nodule    Assessment     1. Multinodular goiter          Plan  In summary, Ms. Castro is a 43 year old female with longstanding history of MNG. Will obtain repeat ultrasound for surveillance and US-guided FNA as indicated. All questions were answered and she is in agreement with the plan. I will call her with the US results.

## 2018-11-20 ENCOUNTER — CLINICAL SUPPORT (OUTPATIENT)
Dept: DERMATOLOGY | Facility: CLINIC | Age: 43
End: 2018-11-20
Payer: COMMERCIAL

## 2018-11-20 DIAGNOSIS — Z85.72 HISTORY OF CUTANEOUS T-CELL LYMPHOMA: ICD-10-CM

## 2018-11-20 PROCEDURE — 96900 ACTINOTHERAPY UV LIGHT: CPT | Mod: S$GLB,,, | Performed by: DERMATOLOGY

## 2018-11-27 ENCOUNTER — HOSPITAL ENCOUNTER (OUTPATIENT)
Dept: RADIOLOGY | Facility: HOSPITAL | Age: 43
Discharge: HOME | End: 2018-11-27
Attending: INTERNAL MEDICINE
Payer: COMMERCIAL

## 2018-11-27 DIAGNOSIS — C84.A4 CUTANEOUS T-CELL LYMPHOMA INVOLVING LYMPH NODES OF UPPER EXTREMITY: ICD-10-CM

## 2018-11-27 LAB — POCT GLUCOSE: 90 MG/DL (ref 70–110)

## 2018-11-27 PROCEDURE — A9552 F18 FDG: HCPCS

## 2018-11-27 PROCEDURE — 78815 PET IMAGE W/CT SKULL-THIGH: CPT | Mod: 26,PI,, | Performed by: RADIOLOGY

## 2018-11-28 ENCOUNTER — HOSPITAL ENCOUNTER (OUTPATIENT)
Dept: RADIOLOGY | Facility: HOSPITAL | Age: 43
Discharge: HOME | End: 2018-11-28
Attending: OTOLARYNGOLOGY
Payer: COMMERCIAL

## 2018-11-28 DIAGNOSIS — E04.2 MULTINODULAR GOITER: ICD-10-CM

## 2018-11-28 PROCEDURE — 76536 US EXAM OF HEAD AND NECK: CPT | Mod: TC

## 2018-11-28 PROCEDURE — 76536 US EXAM OF HEAD AND NECK: CPT | Mod: 26,,, | Performed by: RADIOLOGY

## 2018-12-05 ENCOUNTER — CLINICAL SUPPORT (OUTPATIENT)
Dept: DERMATOLOGY | Facility: CLINIC | Age: 43
End: 2018-12-05
Payer: COMMERCIAL

## 2018-12-05 DIAGNOSIS — Z85.72 HISTORY OF CUTANEOUS T-CELL LYMPHOMA: ICD-10-CM

## 2018-12-05 PROCEDURE — 96900 ACTINOTHERAPY UV LIGHT: CPT | Mod: S$GLB,,, | Performed by: DERMATOLOGY

## 2018-12-07 ENCOUNTER — CLINICAL SUPPORT (OUTPATIENT)
Dept: DERMATOLOGY | Facility: CLINIC | Age: 43
End: 2018-12-07
Payer: COMMERCIAL

## 2018-12-07 DIAGNOSIS — Z85.72 HISTORY OF CUTANEOUS T-CELL LYMPHOMA: ICD-10-CM

## 2018-12-07 PROCEDURE — 96900 ACTINOTHERAPY UV LIGHT: CPT | Mod: S$GLB,,, | Performed by: DERMATOLOGY

## 2018-12-11 ENCOUNTER — CLINICAL SUPPORT (OUTPATIENT)
Dept: DERMATOLOGY | Facility: CLINIC | Age: 43
End: 2018-12-11
Payer: COMMERCIAL

## 2018-12-11 DIAGNOSIS — E04.2 MULTIPLE THYROID NODULES: Primary | ICD-10-CM

## 2018-12-11 DIAGNOSIS — Z85.72 HISTORY OF CUTANEOUS T-CELL LYMPHOMA: ICD-10-CM

## 2018-12-11 PROCEDURE — 96900 ACTINOTHERAPY UV LIGHT: CPT | Mod: S$GLB,,, | Performed by: DERMATOLOGY

## 2018-12-14 ENCOUNTER — CLINICAL SUPPORT (OUTPATIENT)
Dept: DERMATOLOGY | Facility: CLINIC | Age: 43
End: 2018-12-14
Payer: COMMERCIAL

## 2018-12-14 ENCOUNTER — TELEPHONE (OUTPATIENT)
Dept: HEMATOLOGY/ONCOLOGY | Facility: CLINIC | Age: 43
End: 2018-12-14

## 2018-12-14 DIAGNOSIS — Z85.72 HISTORY OF CUTANEOUS T-CELL LYMPHOMA: ICD-10-CM

## 2018-12-14 PROCEDURE — 96900 ACTINOTHERAPY UV LIGHT: CPT | Mod: S$GLB,,, | Performed by: DERMATOLOGY

## 2018-12-14 NOTE — TELEPHONE ENCOUNTER
----- Message from Savanna Herrera MD sent at 12/14/2018  2:17 PM CST -----  Contact: Pt  She should be able to see it online.     See if she can come in sooner than mid January to discuss the results      ----- Message -----  From: Eleonora Ruff RN  Sent: 12/14/2018  10:48 AM  To: Savanna Herrera MD    She would like PET scan result  ----- Message -----  From: Araseli Pan  Sent: 12/14/2018  10:33 AM  To: [facility]ral[facility] Staff    Pt called to speak with nurse to get PET scan results   Callback#[phone]  Thank You  JUAQUIN Pan      Appointment made for 12/17

## 2018-12-17 ENCOUNTER — OFFICE VISIT (OUTPATIENT)
Dept: HEMATOLOGY/ONCOLOGY | Facility: CLINIC | Age: 43
End: 2018-12-17
Payer: COMMERCIAL

## 2018-12-17 VITALS
TEMPERATURE: 99 F | SYSTOLIC BLOOD PRESSURE: 117 MMHG | HEART RATE: 84 BPM | HEIGHT: 62 IN | WEIGHT: 173.31 LBS | DIASTOLIC BLOOD PRESSURE: 74 MMHG | BODY MASS INDEX: 31.89 KG/M2

## 2018-12-17 DIAGNOSIS — C84.A4 CUTANEOUS T-CELL LYMPHOMA INVOLVING LYMPH NODES OF UPPER EXTREMITY: Primary | ICD-10-CM

## 2018-12-17 DIAGNOSIS — C84.A8 CUTANEOUS T-CELL LYMPHOMA INVOLVING LYMPH NODES OF MULTIPLE REGIONS: ICD-10-CM

## 2018-12-17 PROCEDURE — 99999 PR PBB SHADOW E&M-EST. PATIENT-LVL III: CPT | Mod: PBBFAC,,, | Performed by: INTERNAL MEDICINE

## 2018-12-17 PROCEDURE — 3008F BODY MASS INDEX DOCD: CPT | Mod: CPTII,S$GLB,, | Performed by: INTERNAL MEDICINE

## 2018-12-17 PROCEDURE — 99215 OFFICE O/P EST HI 40 MIN: CPT | Mod: S$GLB,,, | Performed by: INTERNAL MEDICINE

## 2018-12-17 NOTE — PROGRESS NOTES
"Hematology and Medical Oncology   Follow Up     12/17/2018    Primary Oncologic Diagnosis:CTCL    History of Present Ilness:   Jina Castro (Jina) is a pleasant 43 y.o.female with a prolonged course of active skin CTCL. She presents today to re-establish care for her CTCL. She was previously followed at [facility], until they became out of network for her insurance.  She has been on and off PUVA therapy as her time constraints allowed.  About 1 year ago she noticed recurrance of cutaneous lesions oh her upper extremity and trunk.  Several lesion seen at the sites of tatoos with notable hypopigmentation. The lesions do not itch and they are not painful. This week she restarted twice weekly PUVA.    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 McBurney in the past, but has not seen her in several years.  Cleared with nbUVB in 2015, but job/time commitments made continued light tx difficult to continue at the time.  Stayed clear for at least a year.    --CT Chesr/Abd/Pelvis: Mildly enlarged bilateral axillary lymph nodes with abnormal morphology measuring up to 0.9 cm on the right (series 2, image 35) and 0.9 cm on the left (series 2, image 28).    --PET on 11/27/18 There are axillary hypermetabolic skin lesions.  There are mildly hypermetabolic axillary and inguinal lymph nodes.  Index right axillary skin lesion SUV max 3.15.  Index left axillary skin lesion SUV max 2.08.  Index right axillary lymph node SUV max 1.5.  Index left inguinal lymph node SUV max 1.61.  Multinodular substernal goiter with hypermetabolic nodules.  Index right SUV max 5.42.  Index left SUV max 3.69.  FNA may be warranted.  Hypermetabolic thyroid nodules have up to a 40% chance of being malignant.    Interval History:  Ms. Castro is doing well with PUVA " twice weekly. The lesions do not itch and she feels as though they are getting better.      PAST MEDICAL HISTORY:   No past medical history on file.    PAST SURGICAL HISTORY:   No past surgical history on file.    PAST SOCIAL HISTORY:   reports that  has never smoked. She does not have any smokeless tobacco history on file.    FAMILY HISTORY:  Family History   Problem Relation Age of Onset    Melanoma Neg Hx     Psoriasis Neg Hx     Lupus Neg Hx        CURRENT MEDICATIONS:   No current outpatient medications on file.     No current facility-administered medications for this visit.      ALLERGIES:   Review of patient's allergies indicates:  No Known Allergies      Review of Systems:     Review of Systems   Constitutional: Negative for appetite change, chills, diaphoresis, fatigue, fever and unexpected weight change.   HENT:   Negative for hearing loss, mouth sores, nosebleeds, sore throat, trouble swallowing and voice change.    Eyes: Negative for eye problems and icterus.   Respiratory: Negative for chest tightness, cough, hemoptysis, shortness of breath and wheezing.    Cardiovascular: Negative for chest pain, leg swelling and palpitations.   Gastrointestinal: Negative for abdominal distention, abdominal pain, blood in stool, diarrhea, nausea and vomiting.   Endocrine: Negative for hot flashes.   Genitourinary: Negative for bladder incontinence, difficulty urinating, dysuria and hematuria.    Musculoskeletal: Negative for arthralgias, back pain, flank pain, gait problem, myalgias, neck pain and neck stiffness.   Skin: Positive for rash. Negative for itching and wound.        Numerous large hypopigmented plaques on upper extremity and trunk   Neurological: Negative for dizziness, extremity weakness, gait problem, headaches, numbness, seizures and speech difficulty.   Hematological: Negative for adenopathy. Does not bruise/bleed easily.   Psychiatric/Behavioral: Negative for confusion, depression and sleep  disturbance. The patient is not nervous/anxious.           Physical Exam:     Vitals:    12/17/18 1625   BP: 117/74   Pulse: 84   Temp: 98.8 °F (37.1 °C)       Physical Exam   Constitutional: She is oriented to person, place, and time. She appears well-developed and well-nourished. No distress.   HENT:   Head: Normocephalic and atraumatic.   Mouth/Throat: Oropharynx is clear and moist. No oropharyngeal exudate.   Eyes: Conjunctivae and EOM are normal. Pupils are equal, round, and reactive to light.   Neck: Normal range of motion. Neck supple. No JVD present. No tracheal deviation present. No thyromegaly present.   Cardiovascular: Normal rate, regular rhythm and normal heart sounds. Exam reveals no friction rub.   No murmur heard.  Pulmonary/Chest: Effort normal and breath sounds normal. No stridor. No respiratory distress. She has no wheezes. She has no rales. She exhibits no tenderness.   Abdominal: Soft. Bowel sounds are normal. She exhibits no distension. There is no tenderness. There is no rebound and no guarding.   Musculoskeletal: Normal range of motion. She exhibits no edema, tenderness or deformity.   Lymphadenopathy:     She has no axillary adenopathy.   Neurological: She is alert and oriented to person, place, and time. She displays normal reflexes. No cranial nerve deficit or sensory deficit. She exhibits normal muscle tone. Coordination normal.   Skin: Skin is warm and dry. Capillary refill takes less than 2 seconds. No rash noted. She is not diaphoretic. No erythema. No pallor.        Psychiatric: She has a normal mood and affect. Her behavior is normal. Judgment and thought content normal.       ECOG Performance Status: (foot note - ECOG PS provided by Eastern Cooperative Oncology Group) 0 - Asymptomatic    Karnofsky Performance Score:  100%- Normal, No Complaints, No Evidence of Disease    Labs:   Lab Results   Component Value Date    WBC 7.28 10/25/2018    HGB 12.4 10/25/2018    HCT 39.0 10/25/2018      10/25/2018    CHOL 142 10/25/2018    TRIG 39 10/25/2018    HDL 39 (L) 10/25/2018    ALT 7 (L) 10/25/2018    AST 13 10/25/2018     10/25/2018    K 4.3 10/25/2018     10/25/2018    CREATININE 0.9 10/25/2018    BUN 13 10/25/2018    CO2 28 10/25/2018    TSH 0.661 10/25/2018         Imaging: Previous imaging has been reviewed   Assessment and Plan:     Ms. Castro is a 43 year old female who is here today to discuss possible treatment options for her CTCL    CTCL  --Most recent biopsy confirmed relapse, flow is negative for blood involvement  --T cell gene re-arrangment is positive  --Multiple new large plagues are now being treated with PUVA twice weekly. We know that the likelihood of developing extracutaneous disease, exclusive of peripheral blood involvement, correlates with the extent of skin involvement. It is exceedingly rare among patients with limited patch or plaque disease, relatively uncommon among those with generalized plaque (8 percent), and most likely among patients with tumors or generalized erythroderma (30 to 42 percent)  --Final staging is pending PET CT and is based upon the involvement of the skin (T), lymph nodes (N), viscera (M), and blood (B)   --CT chest/abd/pelvis was suspicious for bilateral axial involvement given the architecture of the nodes  --Possible excisional biopsy to be determined based on PET  --PET shows low level of activity, but without circulating CTCL cells we are okay to continue to treat topically  --In the future If there is sara involvement we will discuss single agent therapy such as brentuximab    30 minutes were spent face to face with the patient and her  family to discuss the disease, natural history, treatment options and survival statistics. I have provided the patient with an opportunity to ask questions and have all questions answered to his satisfaction.       she will return to clinic in 3 months with labs, but knows to call in the interim  if symptoms change or should a problem arise.        Savanna Herrera MD  Hematology and Medical Oncology  Bone Marrow Transplant  [facility]

## 2018-12-18 ENCOUNTER — TELEPHONE (OUTPATIENT)
Dept: OTOLARYNGOLOGY | Facility: CLINIC | Age: 43
End: 2018-12-18

## 2018-12-18 ENCOUNTER — CLINICAL SUPPORT (OUTPATIENT)
Dept: DERMATOLOGY | Facility: CLINIC | Age: 43
End: 2018-12-18
Payer: COMMERCIAL

## 2018-12-18 DIAGNOSIS — Z85.72 HISTORY OF CUTANEOUS T-CELL LYMPHOMA: ICD-10-CM

## 2018-12-18 PROCEDURE — 96900 ACTINOTHERAPY UV LIGHT: CPT | Mod: S$GLB,,, | Performed by: DERMATOLOGY

## 2018-12-21 ENCOUNTER — TELEPHONE (OUTPATIENT)
Dept: OTOLARYNGOLOGY | Facility: CLINIC | Age: 43
End: 2018-12-21

## 2018-12-21 NOTE — TELEPHONE ENCOUNTER
----- Message from Jordana Banks sent at 12/21/2018  2:41 PM CST -----  Contact: Patient   Needs Advice    Reason for call: Patient is calling to speak to nurse regarding upcoming biopsy. Patient would like to know what  US biopsy consists of.         Communication Preference: [phone]

## 2018-12-28 ENCOUNTER — CLINICAL SUPPORT (OUTPATIENT)
Dept: DERMATOLOGY | Facility: CLINIC | Age: 43
End: 2018-12-28
Payer: COMMERCIAL

## 2018-12-28 ENCOUNTER — HOSPITAL ENCOUNTER (OUTPATIENT)
Dept: RADIOLOGY | Facility: HOSPITAL | Age: 43
Discharge: HOME | End: 2018-12-28
Attending: OTOLARYNGOLOGY
Payer: COMMERCIAL

## 2018-12-28 ENCOUNTER — HOSPITAL ENCOUNTER (OUTPATIENT)
Dept: INTERVENTIONAL RADIOLOGY/VASCULAR | Facility: HOSPITAL | Age: 43
Discharge: HOME | End: 2018-12-28
Attending: OTOLARYNGOLOGY
Payer: COMMERCIAL

## 2018-12-28 DIAGNOSIS — E04.2 MULTIPLE THYROID NODULES: ICD-10-CM

## 2018-12-28 DIAGNOSIS — Z85.72 HISTORY OF CUTANEOUS T-CELL LYMPHOMA: ICD-10-CM

## 2018-12-28 PROCEDURE — 10022 US FINE NEEDLE ASPIRATION WITH IMAGING: CPT

## 2018-12-28 PROCEDURE — 76942 ECHO GUIDE FOR BIOPSY: CPT | Mod: 26,,, | Performed by: RADIOLOGY

## 2018-12-28 PROCEDURE — 88173 CYTOPATH EVAL FNA REPORT: CPT | Mod: 26,,, | Performed by: PATHOLOGY

## 2018-12-28 PROCEDURE — 10022 US FINE NEEDLE ASPIRATION WITH IMAGING: CPT | Mod: RT,,, | Performed by: RADIOLOGY

## 2018-12-28 PROCEDURE — 88173 CYTOPATH EVAL FNA REPORT: CPT | Performed by: PATHOLOGY

## 2018-12-28 PROCEDURE — 96900 ACTINOTHERAPY UV LIGHT: CPT | Mod: S$GLB,,, | Performed by: DERMATOLOGY

## 2018-12-28 NOTE — H&P
Radiology History & Physical      SUBJECTIVE:     Chief Complaint: Bilateral thyroid lobe nodules.     History of Present Illness:  Jina Castro is a 43 y.o. female who presents for FNA of Bilateral thyroid lobe nodules.    No past medical history on file.  No past surgical history on file.    Home Meds:   Prior to Admission medications    Not on File     Anticoagulants/Antiplatelets: no anticoagulation    Allergies: Review of patient's allergies indicates:  No Known Allergies  Sedation History:  no adverse reactions    Review of Systems:   Hematological: no known coagulopathies  Respiratory: no shortness of breath  Cardiovascular: no chest pain  Gastrointestinal: no abdominal pain  Genito-Urinary: no dysuria  Musculoskeletal: negative  Neurological: no TIA or stroke symptoms         OBJECTIVE:     Vital Signs (Most Recent)       Physical Exam:  ASA: 2  Mallampati: 2    General: no acute distress  Mental Status: alert and oriented to person, place and time  HEENT: normocephalic, atraumatic  Chest: unlabored breathing  Abdomen: nondistended  Extremity: moves all extremities    Laboratory  No results found for: INR    Lab Results   Component Value Date    WBC 7.28 10/25/2018    HGB 12.4 10/25/2018    HCT 39.0 10/25/2018    MCV 97 10/25/2018     10/25/2018      Lab Results   Component Value Date    GLU 94 10/25/2018     10/25/2018    K 4.3 10/25/2018     10/25/2018    CO2 28 10/25/2018    BUN 13 10/25/2018    CREATININE 0.9 10/25/2018    CALCIUM 9.2 10/25/2018    ALT 7 (L) 10/25/2018    AST 13 10/25/2018    ALBUMIN 3.6 10/25/2018    BILITOT 0.5 10/25/2018       ASSESSMENT/PLAN:     Sedation Plan: Local.   Patient will undergo FNA of nodules in Bilateral thyroid lobes.    Derik Khanna MD  PGY - 3  Department of Radiology

## 2018-12-28 NOTE — PROCEDURES
Radiology Post-Procedure Note    Pre Op Diagnosis: Bilateral thyroid lobe nodules.   Post Op Diagnosis: Same    Procedure: Ultrasound guided fine needle aspiration of bilateral thyroid nodules.    Procedure performed by: Kodak Fraser and Derik Khanna     Written Informed Consent Obtained: Yes  Specimen Removed: YES  - 4 passes through each nodule.   Estimated Blood Loss: Minimal    Findings:   Successful FNA of nodules in Bilateral thyroid lobes.     Patient tolerated procedure well.    Derik Khanna MD  PGY - 3  Department of Radiology

## 2019-01-03 ENCOUNTER — CLINICAL SUPPORT (OUTPATIENT)
Dept: DERMATOLOGY | Facility: CLINIC | Age: 44
End: 2019-01-03
Payer: COMMERCIAL

## 2019-01-03 DIAGNOSIS — Z85.72 HISTORY OF CUTANEOUS T-CELL LYMPHOMA: ICD-10-CM

## 2019-01-03 PROCEDURE — 96900 ACTINOTHERAPY UV LIGHT: CPT | Mod: S$GLB,,, | Performed by: DERMATOLOGY

## 2019-01-03 PROCEDURE — 96900 PR ULTRAVIOLET LIGHT THERAPY: ICD-10-PCS | Mod: S$GLB,,, | Performed by: DERMATOLOGY

## 2019-01-07 ENCOUNTER — CLINICAL SUPPORT (OUTPATIENT)
Dept: DERMATOLOGY | Facility: CLINIC | Age: 44
End: 2019-01-07
Payer: COMMERCIAL

## 2019-01-07 ENCOUNTER — TELEPHONE (OUTPATIENT)
Dept: OTOLARYNGOLOGY | Facility: CLINIC | Age: 44
End: 2019-01-07

## 2019-01-07 DIAGNOSIS — Z85.72 HISTORY OF CUTANEOUS T-CELL LYMPHOMA: ICD-10-CM

## 2019-01-07 PROCEDURE — 96900 PR ULTRAVIOLET LIGHT THERAPY: ICD-10-PCS | Mod: S$GLB,,, | Performed by: DERMATOLOGY

## 2019-01-07 PROCEDURE — 96900 ACTINOTHERAPY UV LIGHT: CPT | Mod: S$GLB,,, | Performed by: DERMATOLOGY

## 2019-01-07 NOTE — TELEPHONE ENCOUNTER
----- Message from Jordana Banks sent at 1/7/2019 10:48 AM CST -----  Contact: Patient   Test Results    Type of Test: Ultrasound Biopsy    Date of Test: 12/28    Communication Preference: [phone]

## 2019-01-08 ENCOUNTER — OFFICE VISIT (OUTPATIENT)
Dept: DERMATOLOGY | Facility: CLINIC | Age: 44
End: 2019-01-08
Payer: COMMERCIAL

## 2019-01-08 DIAGNOSIS — C84.00 MYCOSIS FUNGOIDES, UNSPECIFIED BODY REGION: Primary | ICD-10-CM

## 2019-01-08 PROCEDURE — 99213 OFFICE O/P EST LOW 20 MIN: CPT | Mod: S$GLB,,, | Performed by: PATHOLOGY

## 2019-01-08 PROCEDURE — 99213 PR OFFICE/OUTPT VISIT, EST, LEVL III, 20-29 MIN: ICD-10-PCS | Mod: S$GLB,,, | Performed by: PATHOLOGY

## 2019-01-08 PROCEDURE — 99999 PR PBB SHADOW E&M-EST. PATIENT-LVL II: CPT | Mod: PBBFAC,,, | Performed by: PATHOLOGY

## 2019-01-08 PROCEDURE — 99999 PR PBB SHADOW E&M-EST. PATIENT-LVL II: ICD-10-PCS | Mod: PBBFAC,,, | Performed by: PATHOLOGY

## 2019-01-15 ENCOUNTER — CLINICAL SUPPORT (OUTPATIENT)
Dept: DERMATOLOGY | Facility: CLINIC | Age: 44
End: 2019-01-15
Payer: COMMERCIAL

## 2019-01-15 DIAGNOSIS — Z85.72 HISTORY OF CUTANEOUS T-CELL LYMPHOMA: ICD-10-CM

## 2019-01-15 PROCEDURE — 96900 PR ULTRAVIOLET LIGHT THERAPY: ICD-10-PCS | Mod: S$GLB,,, | Performed by: DERMATOLOGY

## 2019-01-15 PROCEDURE — 96900 ACTINOTHERAPY UV LIGHT: CPT | Mod: S$GLB,,, | Performed by: DERMATOLOGY

## 2019-01-17 ENCOUNTER — CLINICAL SUPPORT (OUTPATIENT)
Dept: DERMATOLOGY | Facility: CLINIC | Age: 44
End: 2019-01-17
Payer: COMMERCIAL

## 2019-01-17 DIAGNOSIS — Z85.72 HISTORY OF CUTANEOUS T-CELL LYMPHOMA: ICD-10-CM

## 2019-01-17 PROCEDURE — 96900 ACTINOTHERAPY UV LIGHT: CPT | Mod: S$GLB,,, | Performed by: DERMATOLOGY

## 2019-01-17 PROCEDURE — 96900 PR ULTRAVIOLET LIGHT THERAPY: ICD-10-PCS | Mod: S$GLB,,, | Performed by: DERMATOLOGY

## 2019-01-17 NOTE — PROGRESS NOTES
Light tx 14 for CTCL. No erythema with last dose. NB-UVB to body at 800 mj with eye shielded. Seem by Dr McBurney foe evaluation. Requested she continue light for 3 more months,

## 2019-01-22 ENCOUNTER — CLINICAL SUPPORT (OUTPATIENT)
Dept: DERMATOLOGY | Facility: CLINIC | Age: 44
End: 2019-01-22
Payer: COMMERCIAL

## 2019-01-22 DIAGNOSIS — Z85.72 HISTORY OF CUTANEOUS T-CELL LYMPHOMA: ICD-10-CM

## 2019-01-22 PROCEDURE — 96900 ACTINOTHERAPY UV LIGHT: CPT | Mod: S$GLB,,, | Performed by: DERMATOLOGY

## 2019-01-22 PROCEDURE — 96900 PR ULTRAVIOLET LIGHT THERAPY: ICD-10-PCS | Mod: S$GLB,,, | Performed by: DERMATOLOGY

## 2019-01-24 ENCOUNTER — CLINICAL SUPPORT (OUTPATIENT)
Dept: DERMATOLOGY | Facility: CLINIC | Age: 44
End: 2019-01-24
Payer: COMMERCIAL

## 2019-01-24 DIAGNOSIS — Z85.72 HISTORY OF CUTANEOUS T-CELL LYMPHOMA: ICD-10-CM

## 2019-01-24 PROCEDURE — 96900 ACTINOTHERAPY UV LIGHT: CPT | Mod: S$GLB,,, | Performed by: DERMATOLOGY

## 2019-01-24 PROCEDURE — 96900 PR ULTRAVIOLET LIGHT THERAPY: ICD-10-PCS | Mod: S$GLB,,, | Performed by: DERMATOLOGY

## 2019-01-29 ENCOUNTER — CLINICAL SUPPORT (OUTPATIENT)
Dept: DERMATOLOGY | Facility: CLINIC | Age: 44
End: 2019-01-29
Payer: COMMERCIAL

## 2019-01-29 DIAGNOSIS — Z85.72 HISTORY OF CUTANEOUS T-CELL LYMPHOMA: ICD-10-CM

## 2019-01-29 PROCEDURE — 96900 PR ULTRAVIOLET LIGHT THERAPY: ICD-10-PCS | Mod: S$GLB,,, | Performed by: DERMATOLOGY

## 2019-01-29 PROCEDURE — 96900 ACTINOTHERAPY UV LIGHT: CPT | Mod: S$GLB,,, | Performed by: DERMATOLOGY

## 2019-01-31 ENCOUNTER — CLINICAL SUPPORT (OUTPATIENT)
Dept: DERMATOLOGY | Facility: CLINIC | Age: 44
End: 2019-01-31
Payer: COMMERCIAL

## 2019-01-31 DIAGNOSIS — Z85.72 HISTORY OF CUTANEOUS T-CELL LYMPHOMA: ICD-10-CM

## 2019-01-31 PROCEDURE — 96900 ACTINOTHERAPY UV LIGHT: CPT | Mod: S$GLB,,, | Performed by: DERMATOLOGY

## 2019-01-31 PROCEDURE — 96900 PR ULTRAVIOLET LIGHT THERAPY: ICD-10-PCS | Mod: S$GLB,,, | Performed by: DERMATOLOGY

## 2019-02-05 ENCOUNTER — CLINICAL SUPPORT (OUTPATIENT)
Dept: DERMATOLOGY | Facility: CLINIC | Age: 44
End: 2019-02-05
Payer: COMMERCIAL

## 2019-02-05 DIAGNOSIS — Z85.72 HISTORY OF CUTANEOUS T-CELL LYMPHOMA: ICD-10-CM

## 2019-02-05 PROCEDURE — 99499 UNLISTED E&M SERVICE: CPT | Mod: S$GLB,,, | Performed by: DERMATOLOGY

## 2019-02-05 PROCEDURE — 99499 NO LOS: ICD-10-PCS | Mod: S$GLB,,, | Performed by: DERMATOLOGY

## 2019-02-08 ENCOUNTER — CLINICAL SUPPORT (OUTPATIENT)
Dept: DERMATOLOGY | Facility: CLINIC | Age: 44
End: 2019-02-08
Payer: COMMERCIAL

## 2019-02-08 DIAGNOSIS — Z85.72 HISTORY OF CUTANEOUS T-CELL LYMPHOMA: ICD-10-CM

## 2019-02-08 PROCEDURE — 99499 UNLISTED E&M SERVICE: CPT | Mod: S$GLB,,, | Performed by: DERMATOLOGY

## 2019-02-08 PROCEDURE — 99499 NO LOS: ICD-10-PCS | Mod: S$GLB,,, | Performed by: DERMATOLOGY

## 2019-02-11 ENCOUNTER — CLINICAL SUPPORT (OUTPATIENT)
Dept: DERMATOLOGY | Facility: CLINIC | Age: 44
End: 2019-02-11
Payer: COMMERCIAL

## 2019-02-11 DIAGNOSIS — Z85.72 HISTORY OF CUTANEOUS T-CELL LYMPHOMA: ICD-10-CM

## 2019-02-11 PROCEDURE — 96900 PR ULTRAVIOLET LIGHT THERAPY: ICD-10-PCS | Mod: S$GLB,,, | Performed by: DERMATOLOGY

## 2019-02-11 PROCEDURE — 96900 ACTINOTHERAPY UV LIGHT: CPT | Mod: S$GLB,,, | Performed by: DERMATOLOGY

## 2019-02-14 ENCOUNTER — CLINICAL SUPPORT (OUTPATIENT)
Dept: DERMATOLOGY | Facility: CLINIC | Age: 44
End: 2019-02-14
Payer: COMMERCIAL

## 2019-02-14 DIAGNOSIS — Z85.72 HISTORY OF CUTANEOUS T-CELL LYMPHOMA: ICD-10-CM

## 2019-02-14 PROCEDURE — 96900 ACTINOTHERAPY UV LIGHT: CPT | Mod: S$GLB,,, | Performed by: DERMATOLOGY

## 2019-02-14 PROCEDURE — 96900 PR ULTRAVIOLET LIGHT THERAPY: ICD-10-PCS | Mod: S$GLB,,, | Performed by: DERMATOLOGY

## 2019-02-19 ENCOUNTER — CLINICAL SUPPORT (OUTPATIENT)
Dept: DERMATOLOGY | Facility: CLINIC | Age: 44
End: 2019-02-19
Payer: COMMERCIAL

## 2019-02-19 DIAGNOSIS — Z85.72 HISTORY OF CUTANEOUS T-CELL LYMPHOMA: ICD-10-CM

## 2019-02-19 PROCEDURE — 99499 NO LOS: ICD-10-PCS | Mod: S$GLB,,, | Performed by: DERMATOLOGY

## 2019-02-19 PROCEDURE — 99499 UNLISTED E&M SERVICE: CPT | Mod: S$GLB,,, | Performed by: DERMATOLOGY

## 2019-02-22 ENCOUNTER — CLINICAL SUPPORT (OUTPATIENT)
Dept: DERMATOLOGY | Facility: CLINIC | Age: 44
End: 2019-02-22
Payer: COMMERCIAL

## 2019-02-22 DIAGNOSIS — Z85.72 HISTORY OF CUTANEOUS T-CELL LYMPHOMA: ICD-10-CM

## 2019-02-22 PROCEDURE — 99499 UNLISTED E&M SERVICE: CPT | Mod: S$GLB,,, | Performed by: DERMATOLOGY

## 2019-02-22 PROCEDURE — 99499 NO LOS: ICD-10-PCS | Mod: S$GLB,,, | Performed by: DERMATOLOGY

## 2019-02-26 ENCOUNTER — CLINICAL SUPPORT (OUTPATIENT)
Dept: DERMATOLOGY | Facility: CLINIC | Age: 44
End: 2019-02-26
Payer: COMMERCIAL

## 2019-02-26 DIAGNOSIS — Z85.72 HISTORY OF CUTANEOUS T-CELL LYMPHOMA: ICD-10-CM

## 2019-02-26 PROCEDURE — 96900 PR ULTRAVIOLET LIGHT THERAPY: ICD-10-PCS | Mod: S$GLB,,, | Performed by: DERMATOLOGY

## 2019-02-26 PROCEDURE — 96900 ACTINOTHERAPY UV LIGHT: CPT | Mod: S$GLB,,, | Performed by: DERMATOLOGY

## 2019-02-28 ENCOUNTER — CLINICAL SUPPORT (OUTPATIENT)
Dept: DERMATOLOGY | Facility: CLINIC | Age: 44
End: 2019-02-28
Payer: COMMERCIAL

## 2019-02-28 DIAGNOSIS — Z85.72 HISTORY OF CUTANEOUS T-CELL LYMPHOMA: ICD-10-CM

## 2019-02-28 PROCEDURE — 99499 NO LOS: ICD-10-PCS | Mod: S$GLB,,, | Performed by: DERMATOLOGY

## 2019-02-28 PROCEDURE — 99499 UNLISTED E&M SERVICE: CPT | Mod: S$GLB,,, | Performed by: DERMATOLOGY

## 2019-03-07 ENCOUNTER — TELEPHONE (OUTPATIENT)
Dept: DERMATOLOGY | Facility: CLINIC | Age: 44
End: 2019-03-07

## 2019-03-07 NOTE — TELEPHONE ENCOUNTER
----- Message from Randa Combs sent at 3/7/2019  1:51 PM CST -----  Contact: Celeste with [employer]   Needs Advice    Reason for call: Celeste states a few faxes have been sent requesting a script for a home photo therapy unit, would like to confirm the faxes were received and get a status on this request        Communication Preference: # [phone] ext [phone]    Additional Information:

## 2019-03-12 ENCOUNTER — CLINICAL SUPPORT (OUTPATIENT)
Dept: DERMATOLOGY | Facility: CLINIC | Age: 44
End: 2019-03-12
Payer: COMMERCIAL

## 2019-03-12 DIAGNOSIS — Z85.72 HISTORY OF CUTANEOUS T-CELL LYMPHOMA: ICD-10-CM

## 2019-03-12 PROCEDURE — 99499 NO LOS: ICD-10-PCS | Mod: S$GLB,,, | Performed by: DERMATOLOGY

## 2019-03-12 PROCEDURE — 99499 UNLISTED E&M SERVICE: CPT | Mod: S$GLB,,, | Performed by: DERMATOLOGY

## 2019-03-13 ENCOUNTER — TELEPHONE (OUTPATIENT)
Dept: HEMATOLOGY/ONCOLOGY | Facility: CLINIC | Age: 44
End: 2019-03-13

## 2019-03-13 NOTE — TELEPHONE ENCOUNTER
Returned call to reschedule appt she rescheduled for Monday 3/25 and lab on 3/22.  Mailed appt slip.      ----- Message from Araseli Pan sent at 3/13/2019 11:29 AM CDT -----  Contact: Pt  Pt called to reschedule appt 3/18 and need a early appt only   Callback#[phone]  Thank You  JUAQUIN Pan

## 2019-03-14 ENCOUNTER — CLINICAL SUPPORT (OUTPATIENT)
Dept: DERMATOLOGY | Facility: CLINIC | Age: 44
End: 2019-03-14
Payer: COMMERCIAL

## 2019-03-14 DIAGNOSIS — Z85.72 HISTORY OF CUTANEOUS T-CELL LYMPHOMA: ICD-10-CM

## 2019-03-14 PROCEDURE — 96900 ACTINOTHERAPY UV LIGHT: CPT | Mod: S$GLB,,, | Performed by: DERMATOLOGY

## 2019-03-14 PROCEDURE — 96900 PR ULTRAVIOLET LIGHT THERAPY: ICD-10-PCS | Mod: S$GLB,,, | Performed by: DERMATOLOGY

## 2019-03-19 ENCOUNTER — CLINICAL SUPPORT (OUTPATIENT)
Dept: DERMATOLOGY | Facility: CLINIC | Age: 44
End: 2019-03-19
Payer: COMMERCIAL

## 2019-03-19 DIAGNOSIS — Z85.72 HISTORY OF CUTANEOUS T-CELL LYMPHOMA: ICD-10-CM

## 2019-03-19 PROCEDURE — 99499 UNLISTED E&M SERVICE: CPT | Mod: S$GLB,,, | Performed by: DERMATOLOGY

## 2019-03-19 PROCEDURE — 99499 NO LOS: ICD-10-PCS | Mod: S$GLB,,, | Performed by: DERMATOLOGY

## 2019-03-21 ENCOUNTER — CLINICAL SUPPORT (OUTPATIENT)
Dept: DERMATOLOGY | Facility: CLINIC | Age: 44
End: 2019-03-21
Payer: COMMERCIAL

## 2019-03-21 DIAGNOSIS — Z85.72 HISTORY OF CUTANEOUS T-CELL LYMPHOMA: ICD-10-CM

## 2019-03-21 PROCEDURE — 96900 ACTINOTHERAPY UV LIGHT: CPT | Mod: S$GLB,,, | Performed by: DERMATOLOGY

## 2019-03-21 PROCEDURE — 96900 PR ULTRAVIOLET LIGHT THERAPY: ICD-10-PCS | Mod: S$GLB,,, | Performed by: DERMATOLOGY

## 2019-03-22 ENCOUNTER — LAB VISIT (OUTPATIENT)
Dept: LAB | Facility: HOSPITAL | Age: 44
End: 2019-03-22
Attending: INTERNAL MEDICINE
Payer: COMMERCIAL

## 2019-03-22 DIAGNOSIS — C84.A4 CUTANEOUS T-CELL LYMPHOMA INVOLVING LYMPH NODES OF UPPER EXTREMITY: ICD-10-CM

## 2019-03-22 LAB
ALBUMIN SERPL BCP-MCNC: 3.6 G/DL
ALP SERPL-CCNC: 65 U/L
ALT SERPL W/O P-5'-P-CCNC: 7 U/L
ANION GAP SERPL CALC-SCNC: 6 MMOL/L
AST SERPL-CCNC: 12 U/L
BASOPHILS # BLD AUTO: 0.08 K/UL
BASOPHILS NFR BLD: 0.7 %
BILIRUB SERPL-MCNC: 0.5 MG/DL
BUN SERPL-MCNC: 16 MG/DL
CALCIUM SERPL-MCNC: 9.2 MG/DL
CHLORIDE SERPL-SCNC: 107 MMOL/L
CO2 SERPL-SCNC: 25 MMOL/L
CREAT SERPL-MCNC: 0.8 MG/DL
DIFFERENTIAL METHOD: ABNORMAL
EOSINOPHIL # BLD AUTO: 0.4 K/UL
EOSINOPHIL NFR BLD: 3.7 %
ERYTHROCYTE [DISTWIDTH] IN BLOOD BY AUTOMATED COUNT: 13.7 %
EST. GFR  (AFRICAN AMERICAN): >60 ML/MIN/1.73 M^2
EST. GFR  (NON AFRICAN AMERICAN): >60 ML/MIN/1.73 M^2
GLUCOSE SERPL-MCNC: 91 MG/DL
HCT VFR BLD AUTO: 36.6 %
HGB BLD-MCNC: 12.1 G/DL
IMM GRANULOCYTES # BLD AUTO: 0.04 K/UL
IMM GRANULOCYTES NFR BLD AUTO: 0.4 %
LYMPHOCYTES # BLD AUTO: 2.6 K/UL
LYMPHOCYTES NFR BLD: 24.3 %
MCH RBC QN AUTO: 31 PG
MCHC RBC AUTO-ENTMCNC: 33.1 G/DL
MCV RBC AUTO: 94 FL
MONOCYTES # BLD AUTO: 1.3 K/UL
MONOCYTES NFR BLD: 12.1 %
NEUTROPHILS # BLD AUTO: 6.4 K/UL
NEUTROPHILS NFR BLD: 58.8 %
NRBC BLD-RTO: 0 /100 WBC
PLATELET # BLD AUTO: 313 K/UL
PMV BLD AUTO: 10.8 FL
POTASSIUM SERPL-SCNC: 4.5 MMOL/L
PROT SERPL-MCNC: 7 G/DL
RBC # BLD AUTO: 3.9 M/UL
SODIUM SERPL-SCNC: 138 MMOL/L
WBC # BLD AUTO: 10.84 K/UL

## 2019-03-22 PROCEDURE — 36415 COLL VENOUS BLD VENIPUNCTURE: CPT

## 2019-03-22 PROCEDURE — 85025 COMPLETE CBC W/AUTO DIFF WBC: CPT

## 2019-03-22 PROCEDURE — 80053 COMPREHEN METABOLIC PANEL: CPT

## 2019-03-25 ENCOUNTER — OFFICE VISIT (OUTPATIENT)
Dept: HEMATOLOGY/ONCOLOGY | Facility: CLINIC | Age: 44
End: 2019-03-25
Payer: COMMERCIAL

## 2019-03-25 VITALS
HEIGHT: 62 IN | OXYGEN SATURATION: 99 % | WEIGHT: 166 LBS | DIASTOLIC BLOOD PRESSURE: 65 MMHG | RESPIRATION RATE: 16 BRPM | HEART RATE: 80 BPM | BODY MASS INDEX: 30.55 KG/M2 | TEMPERATURE: 98 F | SYSTOLIC BLOOD PRESSURE: 106 MMHG

## 2019-03-25 DIAGNOSIS — C84.A8 CUTANEOUS T-CELL LYMPHOMA INVOLVING LYMPH NODES OF MULTIPLE REGIONS: Primary | ICD-10-CM

## 2019-03-25 PROCEDURE — 99999 PR PBB SHADOW E&M-EST. PATIENT-LVL III: CPT | Mod: PBBFAC,,, | Performed by: INTERNAL MEDICINE

## 2019-03-25 PROCEDURE — 99215 PR OFFICE/OUTPT VISIT, EST, LEVL V, 40-54 MIN: ICD-10-PCS | Mod: S$GLB,,, | Performed by: INTERNAL MEDICINE

## 2019-03-25 PROCEDURE — 99999 PR PBB SHADOW E&M-EST. PATIENT-LVL III: ICD-10-PCS | Mod: PBBFAC,,, | Performed by: INTERNAL MEDICINE

## 2019-03-25 PROCEDURE — 3008F PR BODY MASS INDEX (BMI) DOCUMENTED: ICD-10-PCS | Mod: CPTII,S$GLB,, | Performed by: INTERNAL MEDICINE

## 2019-03-25 PROCEDURE — 99215 OFFICE O/P EST HI 40 MIN: CPT | Mod: S$GLB,,, | Performed by: INTERNAL MEDICINE

## 2019-03-25 PROCEDURE — 3008F BODY MASS INDEX DOCD: CPT | Mod: CPTII,S$GLB,, | Performed by: INTERNAL MEDICINE

## 2019-03-25 NOTE — PROGRESS NOTES
"Hematology and Medical Oncology   Follow Up     03/25/2019    Primary Oncologic Diagnosis:CTCL    History of Present Ilness:   Jina Castro (Jina) is a pleasant 43 y.o.female with a prolonged course of active skin CTCL. She presents today to re-establish care for her CTCL. She was previously followed at [facility], until they became out of network for her insurance.  She has been on and off PUVA therapy as her time constraints allowed.  About 1 year ago she noticed recurrance of cutaneous lesions oh her upper extremity and trunk.  Several lesion seen at the sites of tatoos with notable hypopigmentation. The lesions do not itch and they are not painful. This week she restarted twice weekly PUVA.    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 McBurney in the past, but has not seen her in several years.  Cleared with nbUVB in 2015, but job/time commitments made continued light tx difficult to continue at the time.  Stayed clear for at least a year.    --CT Chesr/Abd/Pelvis: Mildly enlarged bilateral axillary lymph nodes with abnormal morphology measuring up to 0.9 cm on the right (series 2, image 35) and 0.9 cm on the left (series 2, image 28).    --PET on 11/27/18 There are axillary hypermetabolic skin lesions.  There are mildly hypermetabolic axillary and inguinal lymph nodes.  Index right axillary skin lesion SUV max 3.15.  Index left axillary skin lesion SUV max 2.08.  Index right axillary lymph node SUV max 1.5.  Index left inguinal lymph node SUV max 1.61.  Multinodular substernal goiter with hypermetabolic nodules.  Index right SUV max 5.42.  Index left SUV max 3.69.  FNA may be warranted.  Hypermetabolic thyroid nodules have up to a 40% chance of being malignant.    Interval History:  Ms. Castro is doing well with PUVA " twice weekly on Tuesday/Thursday. The lesions do not itch and she feels as though they are getting better. They are lighter in color and cover a smaller area than previously seen.      PAST MEDICAL HISTORY:   No past medical history on file.    PAST SURGICAL HISTORY:   No past surgical history on file.    PAST SOCIAL HISTORY:   reports that she has never smoked. She does not have any smokeless tobacco history on file. She reports that she does not drink alcohol or use drugs.    FAMILY HISTORY:  Family History   Problem Relation Age of Onset    Melanoma Neg Hx     Psoriasis Neg Hx     Lupus Neg Hx        CURRENT MEDICATIONS:   No current outpatient medications on file.     No current facility-administered medications for this visit.      ALLERGIES:   Review of patient's allergies indicates:  No Known Allergies      Review of Systems:     Review of Systems   Constitutional: Negative for appetite change, chills, diaphoresis, fatigue, fever and unexpected weight change.   HENT:   Negative for hearing loss, mouth sores, nosebleeds, sore throat, trouble swallowing and voice change.    Eyes: Negative for eye problems and icterus.   Respiratory: Negative for chest tightness, cough, hemoptysis, shortness of breath and wheezing.    Cardiovascular: Negative for chest pain, leg swelling and palpitations.   Gastrointestinal: Negative for abdominal distention, abdominal pain, blood in stool, diarrhea, nausea and vomiting.   Endocrine: Negative for hot flashes.   Genitourinary: Negative for bladder incontinence, difficulty urinating, dysuria and hematuria.    Musculoskeletal: Negative for arthralgias, back pain, flank pain, gait problem, myalgias, neck pain and neck stiffness.   Skin: Positive for rash. Negative for itching and wound.        Numerous large hypopigmented plaques on upper extremity and trunk   Neurological: Negative for dizziness, extremity weakness, gait problem, headaches, numbness, seizures and speech  difficulty.   Hematological: Negative for adenopathy. Does not bruise/bleed easily.   Psychiatric/Behavioral: Negative for confusion, depression and sleep disturbance. The patient is not nervous/anxious.           Physical Exam:     Vitals:    03/25/19 0816   BP: 106/65   Pulse: 80   Resp: 16   Temp: 98.3 °F (36.8 °C)       Physical Exam   Constitutional: She is oriented to person, place, and time. She appears well-developed and well-nourished. No distress.   HENT:   Head: Normocephalic and atraumatic.   Mouth/Throat: Oropharynx is clear and moist. No oropharyngeal exudate.   Eyes: Pupils are equal, round, and reactive to light. Conjunctivae and EOM are normal.   Neck: Normal range of motion. Neck supple. No JVD present. No tracheal deviation present. No thyromegaly present.   Cardiovascular: Normal rate, regular rhythm and normal heart sounds. Exam reveals no friction rub.   No murmur heard.  Pulmonary/Chest: Effort normal and breath sounds normal. No stridor. No respiratory distress. She has no wheezes. She has no rales. She exhibits no tenderness.   Abdominal: Soft. Bowel sounds are normal. She exhibits no distension. There is no tenderness. There is no rebound and no guarding.   Musculoskeletal: Normal range of motion. She exhibits no edema, tenderness or deformity.   Lymphadenopathy:     She has no axillary adenopathy.   Neurological: She is alert and oriented to person, place, and time. She displays normal reflexes. No cranial nerve deficit or sensory deficit. She exhibits normal muscle tone. Coordination normal.   Skin: Skin is warm and dry. Capillary refill takes less than 2 seconds. No rash noted. She is not diaphoretic. No erythema. No pallor.        Psychiatric: She has a normal mood and affect. Her behavior is normal. Judgment and thought content normal.       ECOG Performance Status: (foot note - ECOG PS provided by Eastern Cooperative Oncology Group) 0 - Asymptomatic    Karnofsky Performance Score:   100%- Normal, No Complaints, No Evidence of Disease    Labs:   Lab Results   Component Value Date    WBC 10.84 03/22/2019    HGB 12.1 03/22/2019    HCT 36.6 (L) 03/22/2019     03/22/2019    CHOL 142 10/25/2018    TRIG 39 10/25/2018    HDL 39 (L) 10/25/2018    ALT 7 (L) 03/22/2019    AST 12 03/22/2019     03/22/2019    K 4.5 03/22/2019     03/22/2019    CREATININE 0.8 03/22/2019    BUN 16 03/22/2019    CO2 25 03/22/2019    TSH 0.661 10/25/2018         Imaging: Previous imaging has been reviewed   Assessment and Plan:     Ms. Castro is a 43 year old female who is here today to discuss possible treatment options for her CTCL    CTCL  --Most recent biopsy confirmed relapse, flow is negative for blood involvement  --T cell gene re-arrangment is positive  --Multiple new large plagues are now being treated with PUVA twice weekly. We know that the likelihood of developing extracutaneous disease, exclusive of peripheral blood involvement, correlates with the extent of skin involvement. It is exceedingly rare among patients with limited patch or plaque disease, relatively uncommon among those with generalized plaque (8 percent), and most likely among patients with tumors or generalized erythroderma (30 to 42 percent)  --Final staging is pending PET CT and is based upon the involvement of the skin (T), lymph nodes (N), viscera (M), and blood (B)   --CT chest/abd/pelvis was suspicious for bilateral axial involvement given the architecture of the nodes  --Possible excisional biopsy to be determined based on PET  --PET shows low level of activity, but without circulating CTCL cells we are okay to continue to treat topically  --In the future If there is sara involvement we will discuss single agent therapy such as brentuximab    30 minutes were spent face to face with the patient and her  family to discuss the disease, natural history, treatment options and survival statistics. I have provided the patient with  an opportunity to ask questions and have all questions answered to his satisfaction.       she will return to clinic in 3 months with labs, but knows to call in the interim if symptoms change or should a problem arise.        Savanna Herrera MD  Hematology and Medical Oncology  Bone Marrow Transplant  [facility]

## 2019-03-26 ENCOUNTER — CLINICAL SUPPORT (OUTPATIENT)
Dept: DERMATOLOGY | Facility: CLINIC | Age: 44
End: 2019-03-26
Payer: COMMERCIAL

## 2019-03-26 DIAGNOSIS — Z85.72 HISTORY OF CUTANEOUS T-CELL LYMPHOMA: ICD-10-CM

## 2019-03-26 PROCEDURE — 99499 UNLISTED E&M SERVICE: CPT | Mod: S$GLB,,, | Performed by: DERMATOLOGY

## 2019-03-26 PROCEDURE — 99499 NO LOS: ICD-10-PCS | Mod: S$GLB,,, | Performed by: DERMATOLOGY

## 2019-03-28 ENCOUNTER — CLINICAL SUPPORT (OUTPATIENT)
Dept: DERMATOLOGY | Facility: CLINIC | Age: 44
End: 2019-03-28
Payer: COMMERCIAL

## 2019-03-28 DIAGNOSIS — Z85.72 HISTORY OF CUTANEOUS T-CELL LYMPHOMA: ICD-10-CM

## 2019-03-28 PROCEDURE — 96900 ACTINOTHERAPY UV LIGHT: CPT | Mod: S$GLB,,, | Performed by: DERMATOLOGY

## 2019-03-28 PROCEDURE — 96900 PR ULTRAVIOLET LIGHT THERAPY: ICD-10-PCS | Mod: S$GLB,,, | Performed by: DERMATOLOGY

## 2019-04-01 ENCOUNTER — CLINICAL SUPPORT (OUTPATIENT)
Dept: DERMATOLOGY | Facility: CLINIC | Age: 44
End: 2019-04-01
Payer: COMMERCIAL

## 2019-04-01 DIAGNOSIS — Z85.72 HISTORY OF CUTANEOUS T-CELL LYMPHOMA: ICD-10-CM

## 2019-04-01 PROCEDURE — 96900 PR ULTRAVIOLET LIGHT THERAPY: ICD-10-PCS | Mod: S$GLB,,, | Performed by: DERMATOLOGY

## 2019-04-01 PROCEDURE — 96900 ACTINOTHERAPY UV LIGHT: CPT | Mod: S$GLB,,, | Performed by: DERMATOLOGY

## 2019-04-03 ENCOUNTER — CLINICAL SUPPORT (OUTPATIENT)
Dept: DERMATOLOGY | Facility: CLINIC | Age: 44
End: 2019-04-03
Payer: COMMERCIAL

## 2019-04-03 DIAGNOSIS — Z85.72 HISTORY OF CUTANEOUS T-CELL LYMPHOMA: ICD-10-CM

## 2019-04-03 PROCEDURE — 96900 ACTINOTHERAPY UV LIGHT: CPT | Mod: S$GLB,,, | Performed by: DERMATOLOGY

## 2019-04-03 PROCEDURE — 96900 PR ULTRAVIOLET LIGHT THERAPY: ICD-10-PCS | Mod: S$GLB,,, | Performed by: DERMATOLOGY

## 2019-04-09 ENCOUNTER — OFFICE VISIT (OUTPATIENT)
Dept: DERMATOLOGY | Facility: CLINIC | Age: 44
End: 2019-04-09
Payer: COMMERCIAL

## 2019-04-09 DIAGNOSIS — C84.A0 CUTANEOUS T-CELL LYMPHOMA, UNSPECIFIED BODY REGION: Primary | ICD-10-CM

## 2019-04-09 PROCEDURE — 99214 PR OFFICE/OUTPT VISIT, EST, LEVL IV, 30-39 MIN: ICD-10-PCS | Mod: S$GLB,,, | Performed by: PATHOLOGY

## 2019-04-09 PROCEDURE — 99999 PR PBB SHADOW E&M-EST. PATIENT-LVL II: ICD-10-PCS | Mod: PBBFAC,,, | Performed by: PATHOLOGY

## 2019-04-09 PROCEDURE — 99999 PR PBB SHADOW E&M-EST. PATIENT-LVL II: CPT | Mod: PBBFAC,,, | Performed by: PATHOLOGY

## 2019-04-09 PROCEDURE — 99214 OFFICE O/P EST MOD 30 MIN: CPT | Mod: S$GLB,,, | Performed by: PATHOLOGY

## 2019-04-09 NOTE — PROGRESS NOTES
Subjective:       Patient ID:  Jina Castro is a 43 y.o. 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       FINAL PATHOLOGIC DIAGNOSIS  Skin, right back, punch biopsy:  -CUTANEOUS T-CELL LYMPHOMA, consistent with  COMMENT: The clinical history along with the histological and immunohistochemical features seen are consistent  with cutaneous T-cell lymphoma. Areas of enlarged lymphocytes or large cell transformation are not seen. T-cell  gene rearrangement studies been ordered and will be reported in an addendum to follow.  Diagnosed by: Martin Almaguer  (Electronically Signed: 2018-10-31 13:45:31)  Microscopic Examination  Punch biopsy sections show a superficial perivascular and interstitial lymphohistiocytic infiltrate with admixed  melanophages. Very rare eosinophils are present. The lymphocytes overall appear small to medium in size with focal  nuclear irregularities. Many of the lymphocytes show exocytosis in a mildly spongiotic epidermis. CD3 highlights the  numerous lymphocytes present within the epidermis which extend to the mid levels of the epidermis. Many  lymphocytes are also noted along the dermoepidermal junction. The lymphocytes in the superficial dermis show CD4  to CD8 ratio of at least 10:1. The majority of the intraepidermal lymphocytes are positive for CD8. There is loss of  CD7. PAS stain is negative for fungal organisms. Stains were reviewed in conjunction with adequate positive controls.  This case was also reviewed by Dr. Allen Cates who agrees with the above  "diagnosis.     PMH: Patient with h/o "parasporiasis" since the age of 10.  Diagnosed with CTCL by Asia Andre in 2015 - path report read by Brock Graves MD reviewed by me and scanned into Epic.  Treated by Dr. McBurney.  Cleared with nbUVB in 2015, but job/time commitments made continued light tx difficult to continue at the time.  Stayed clear for at least a year.  Now getting return of erythematous, scaly patches and plaques in areas that had formerly cleared with hypopigmentation.  No associated pruritus.    Has a multinodular goiter that has been biopsied in the past - benign.  Stable in size per patient.  She had FNA December 2018:  FINAL PATHOLOGIC DIAGNOSIS  THYROID GLAND, LEFT LOBE, FNA:  [facility] Thyroid Cytology Category: Benign.  Benign follicular epithelial cells, colloid and macrophages, consistent with a benign follicular nodule.     CT showed:  Impression         1. Mild bilateral axillary and inguinal adenopathy.  2. Markedly enlarged heterogeneous thyroid extending below the sternoclavicular junction.  Recommend additional evaluation with thyroid ultrasound, as clinically indicated.  This report was flagged in Epic as abnormal.      Sezary prep:  Buffy coat from blood sample submitted for flow cytometric analysis   is reviewed. Granulocytes are morphologically unremarkable.  Small   mature lymphocytes are seen.  Scattered atypical mononuclear cells   are present.  Correlation with flow cytometric immunophenotyping is   required to further characterize the lymphocyte population.  Red   blood cells are morphologically unremarkable.  Platelets display   occasional large forms.      HTLVI/II Ab negative, RPR negative, LDH WNL, CBC and CMP WNL, IL-2 receptor and lipid panel unremarkable.     PET/CT November 2018:  There are axillary hypermetabolic skin lesions.  There are mildly hypermetabolic axillary and inguinal lymph nodes.    Index right axillary skin lesion SUV max 3.15.    Index left axillary " skin lesion SUV max 2.08.    Index right axillary lymph node SUV max 1.5.    Index left inguinal lymph node SUV max 1.61.    Multinodular substernal goiter with hypermetabolic nodules.    Index right SUV max 5.42.    Index left SUV max 3.69.    FNA may be warranted.  Hypermetabolic thyroid nodules have up to a 40% chance of being malignant.     Patient is going to be followed q3 months with labs by Heme/Onc.  Last labs March 2019 WNL.                      Review of Systems   Constitutional: Negative for fever, chills, weight loss, weight gain, fatigue, night sweats and malaise.   Gastrointestinal: Negative for abdominal pain.   Skin: Positive for rash. Negative for itching, daily sunscreen use and activity-related sunscreen use.   Hematologic/Lymphatic: Positive for adenopathy.        Objective:    Physical Exam   Constitutional: She appears well-developed and well-nourished.   Neurological: She is alert and oriented to person, place, and time.   Psychiatric: She has a normal mood and affect.   Skin:   Areas Examined (abnormalities noted in diagram):   Scalp / Hair Palpated and Inspected  Head / Face Inspection Performed  Neck Inspection Performed  Chest / Axilla Inspection Performed  Abdomen Inspection Performed  Genitals / Buttocks / Groin Inspection Performed  Back Inspection Performed  RUE Inspected  LUE Inspection Performed  RLE Inspected  LLE Inspection Performed  Nails and Digits Inspection Performed  Gland Inspection Performed              Diagram Legend     Erythematous scaling macule/papule c/w actinic keratosis       Vascular papule c/w angioma      Pigmented verrucoid papule/plaque c/w seborrheic keratosis      Yellow umbilicated papule c/w sebaceous hyperplasia      Irregularly shaped tan macule c/w lentigo     1-2 mm smooth white papules consistent with Milia      Movable subcutaneous cyst with punctum c/w epidermal inclusion cyst      Subcutaneous movable cyst c/w pilar cyst      Firm pink to brown  papule c/w dermatofibroma      Pedunculated fleshy papule(s) c/w skin tag(s)      Evenly pigmented macule c/w junctional nevus     Mildly variegated pigmented, slightly irregular-bordered macule c/w mildly atypical nevus      Flesh colored to evenly pigmented papule c/w intradermal nevus       Pink pearly papule/plaque c/w basal cell carcinoma      Erythematous hyperkeratotic cursted plaque c/w SCC      Surgical scar with no sign of skin cancer recurrence      Open and closed comedones      Inflammatory papules and pustules      Verrucoid papule consistent consistent with wart     Erythematous eczematous patches and plaques     Dystrophic onycholytic nail with subungual debris c/w onychomycosis     Umbilicated papule    Erythematous-base heme-crusted tan verrucoid plaque consistent with inflamed seborrheic keratosis     Erythematous Silvery Scaling Plaque c/w Psoriasis     See annotation      Assessment / Plan:        Cutaneous T-cell lymphoma, unspecified body region - improving on nbUVB.  Also followed by Dr. McBurney.  Will need repeat PET/CT yearly.  flow cytometry negative but TCR shows positive clone in blood.  Some multifocal LAD on PET/CT - unclear etiology.  Pt is being followed by Heme/Onc regularly.  Thyroid goiter FNA negative for malignancy.    - Continue nbUVB per protocol 2x weekly.  Home light unit is scheduled to be delivered soon.    - per Dr. McBurney's assessment, will continue to monitor for improvement and re-evaluate lymph nodes clinically and by imaging periodically; oral targretin may be a consideration.  - f/u with me in 3 months  - labs/imaging and heme/onc notes reviewed             No follow-ups on file.

## 2019-04-10 ENCOUNTER — CLINICAL SUPPORT (OUTPATIENT)
Dept: DERMATOLOGY | Facility: CLINIC | Age: 44
End: 2019-04-10
Payer: COMMERCIAL

## 2019-04-10 DIAGNOSIS — Z85.72 HISTORY OF CUTANEOUS T-CELL LYMPHOMA: ICD-10-CM

## 2019-04-10 PROCEDURE — 96900 ACTINOTHERAPY UV LIGHT: CPT | Mod: S$GLB,,, | Performed by: DERMATOLOGY

## 2019-04-10 PROCEDURE — 96900 PR ULTRAVIOLET LIGHT THERAPY: ICD-10-PCS | Mod: S$GLB,,, | Performed by: DERMATOLOGY

## 2019-04-16 ENCOUNTER — CLINICAL SUPPORT (OUTPATIENT)
Dept: DERMATOLOGY | Facility: CLINIC | Age: 44
End: 2019-04-16
Payer: COMMERCIAL

## 2019-04-16 DIAGNOSIS — Z85.72 HISTORY OF CUTANEOUS T-CELL LYMPHOMA: ICD-10-CM

## 2019-04-16 PROCEDURE — 99499 UNLISTED E&M SERVICE: CPT | Mod: S$GLB,,, | Performed by: DERMATOLOGY

## 2019-04-16 PROCEDURE — 99499 NO LOS: ICD-10-PCS | Mod: S$GLB,,, | Performed by: DERMATOLOGY

## 2019-04-18 ENCOUNTER — CLINICAL SUPPORT (OUTPATIENT)
Dept: DERMATOLOGY | Facility: CLINIC | Age: 44
End: 2019-04-18
Payer: COMMERCIAL

## 2019-04-18 DIAGNOSIS — Z85.72 HISTORY OF CUTANEOUS T-CELL LYMPHOMA: ICD-10-CM

## 2019-04-18 PROCEDURE — 96900 ACTINOTHERAPY UV LIGHT: CPT | Mod: S$GLB,,, | Performed by: DERMATOLOGY

## 2019-04-18 PROCEDURE — 96900 PR ULTRAVIOLET LIGHT THERAPY: ICD-10-PCS | Mod: S$GLB,,, | Performed by: DERMATOLOGY

## 2019-04-24 ENCOUNTER — CLINICAL SUPPORT (OUTPATIENT)
Dept: DERMATOLOGY | Facility: CLINIC | Age: 44
End: 2019-04-24
Payer: COMMERCIAL

## 2019-04-24 DIAGNOSIS — Z85.72 HISTORY OF CUTANEOUS T-CELL LYMPHOMA: ICD-10-CM

## 2019-04-24 PROCEDURE — 99499 NO LOS: ICD-10-PCS | Mod: S$GLB,,, | Performed by: DERMATOLOGY

## 2019-04-24 PROCEDURE — 99499 UNLISTED E&M SERVICE: CPT | Mod: S$GLB,,, | Performed by: DERMATOLOGY

## 2019-04-24 NOTE — PROGRESS NOTES
Light tx 38 for ctcl. NB-UVB to body at 1400 mj with eyes shielded. Patient has rec her home light unit.

## 2019-04-25 DIAGNOSIS — Z51.11 ENCOUNTER FOR CHEMOTHERAPY MANAGEMENT: Primary | ICD-10-CM

## 2019-10-24 ENCOUNTER — HOSPITAL ENCOUNTER (EMERGENCY)
Facility: HOSPITAL | Age: 44
Discharge: HOME | End: 2019-10-24
Attending: EMERGENCY MEDICINE
Payer: COMMERCIAL

## 2019-10-24 VITALS
OXYGEN SATURATION: 100 % | RESPIRATION RATE: 18 BRPM | BODY MASS INDEX: 31.65 KG/M2 | WEIGHT: 172 LBS | TEMPERATURE: 98 F | DIASTOLIC BLOOD PRESSURE: 85 MMHG | HEIGHT: 62 IN | SYSTOLIC BLOOD PRESSURE: 128 MMHG | HEART RATE: 82 BPM

## 2019-10-24 DIAGNOSIS — L73.2 HYDRADENITIS: ICD-10-CM

## 2019-10-24 DIAGNOSIS — L02.412 ABSCESS OF LEFT AXILLA: Primary | ICD-10-CM

## 2019-10-24 PROCEDURE — 10060 I&D ABSCESS SIMPLE/SINGLE: CPT | Mod: LT

## 2019-10-24 PROCEDURE — 10061 I&D ABSCESS COMP/MULTIPLE: CPT | Mod: ,,, | Performed by: PHYSICIAN ASSISTANT

## 2019-10-24 PROCEDURE — 10061 PR DRAIN SKIN ABSCESS COMPLIC: ICD-10-PCS | Mod: ,,, | Performed by: PHYSICIAN ASSISTANT

## 2019-10-24 PROCEDURE — 99284 PR EMERGENCY DEPT VISIT,LEVEL IV: ICD-10-PCS | Mod: 25,,, | Performed by: PHYSICIAN ASSISTANT

## 2019-10-24 PROCEDURE — 25000003 PHARM REV CODE 250: Performed by: PHYSICIAN ASSISTANT

## 2019-10-24 PROCEDURE — 99284 EMERGENCY DEPT VISIT MOD MDM: CPT | Mod: 25,,, | Performed by: PHYSICIAN ASSISTANT

## 2019-10-24 PROCEDURE — 99284 EMERGENCY DEPT VISIT MOD MDM: CPT | Mod: 25

## 2019-10-24 RX ORDER — SULFAMETHOXAZOLE AND TRIMETHOPRIM 800; 160 MG/1; MG/1
1 TABLET ORAL
Status: COMPLETED | OUTPATIENT
Start: 2019-10-24 | End: 2019-10-24

## 2019-10-24 RX ORDER — LIDOCAINE HYDROCHLORIDE AND EPINEPHRINE 20; 10 MG/ML; UG/ML
10 INJECTION, SOLUTION INFILTRATION; PERINEURAL
Status: DISCONTINUED | OUTPATIENT
Start: 2019-10-24 | End: 2019-10-24

## 2019-10-24 RX ORDER — SULFAMETHOXAZOLE AND TRIMETHOPRIM 800; 160 MG/1; MG/1
1 TABLET ORAL EVERY 12 HOURS
Qty: 19 TABLET | Refills: 0 | Status: SHIPPED | OUTPATIENT
Start: 2019-10-24 | End: 2019-11-03

## 2019-10-24 RX ORDER — LIDOCAINE HYDROCHLORIDE 20 MG/ML
10 INJECTION, SOLUTION INFILTRATION; PERINEURAL
Status: COMPLETED | OUTPATIENT
Start: 2019-10-24 | End: 2019-10-24

## 2019-10-24 RX ORDER — HYDROCODONE BITARTRATE AND ACETAMINOPHEN 5; 325 MG/1; MG/1
1 TABLET ORAL EVERY 4 HOURS PRN
Qty: 15 TABLET | Refills: 0 | Status: SHIPPED | OUTPATIENT
Start: 2019-10-24 | End: 2019-10-27

## 2019-10-24 RX ORDER — HYDROCODONE BITARTRATE AND ACETAMINOPHEN 5; 325 MG/1; MG/1
1 TABLET ORAL
Status: COMPLETED | OUTPATIENT
Start: 2019-10-24 | End: 2019-10-24

## 2019-10-24 RX ADMIN — HYDROCODONE BITARTRATE AND ACETAMINOPHEN 1 TABLET: 5; 325 TABLET ORAL at 05:10

## 2019-10-24 RX ADMIN — SULFAMETHOXAZOLE AND TRIMETHOPRIM 1 TABLET: 800; 160 TABLET ORAL at 05:10

## 2019-10-24 RX ADMIN — LIDOCAINE HYDROCHLORIDE 10 ML: 20 INJECTION, SOLUTION INFILTRATION; PERINEURAL at 05:10

## 2019-10-24 NOTE — ED NOTES
Patient identifiers verified and correct for Ms Castro  C/C: Mult abscess Left axilla SEE NN  APPEARANCE: awake and alert in NAD.  SKIN: warm, dry swelling with pinpoint area of edema, min redness noted.   MUSCULOSKELETAL: Patient moving all extremities spontaneously, no obvious swelling or deformities noted. Ambulates independently.  RESPIRATORY: Denies shortness of breath.Respirations unlabored. Denies fevers.   CARDIAC: Denies CP, 2+ distal pulses; no peripheral edema  ABDOMEN: S/ND/NT, Denies nausea  : voids spontaneously, denies difficulty  Neurologic: AAO x 4; follows commands equal strength in all extremities; denies numbness/tingling. Denies dizziness Denies weakness

## 2019-10-24 NOTE — ED PROVIDER NOTES
"Encounter Date: 10/24/2019       History     Chief Complaint   Patient presents with    Abscess     under left arm     The patient presents to the ER c/o an abscess to her left axilla for the past 2-3 days. She describes it as red, painful, and swollen. She states that the degree is moderate. She states that the course is constant. She states that she has had similar symptoms in the past to bilateral axilla and that this is a recurrent problem. She denies diabetes. She denies any fever or chills. She has tried using "drawing salve" at home but denies any improvement. She denies any possibility of pregnancy.          Review of patient's allergies indicates:  No Known Allergies  History reviewed. No pertinent past medical history.  History reviewed. No pertinent surgical history.  Family History   Problem Relation Age of Onset    Melanoma Neg Hx     Psoriasis Neg Hx     Lupus Neg Hx      Social History     Tobacco Use    Smoking status: Never Smoker    Smokeless tobacco: Never Used   Substance Use Topics    Alcohol use: No     Frequency: Never    Drug use: No     Review of Systems   Constitutional: Negative for chills and fever.   Respiratory: Negative for shortness of breath.    Cardiovascular: Negative for chest pain.   Gastrointestinal: Negative for abdominal pain, diarrhea, nausea and vomiting.   Endocrine: Negative for polydipsia and polyuria.   Genitourinary: Negative for difficulty urinating and menstrual problem.   Musculoskeletal: Negative for arthralgias and myalgias.   Skin: Positive for color change. Negative for rash.   Allergic/Immunologic: Negative for immunocompromised state.   Neurological: Negative for dizziness, syncope, weakness and light-headedness.   Hematological: Negative for adenopathy.   Psychiatric/Behavioral: Negative for confusion.       Physical Exam     Initial Vitals [10/24/19 1616]   BP Pulse Resp Temp SpO2   127/73 82 16 99.3 °F (37.4 °C) 100 %      MAP       --     "     Physical Exam    Nursing note and vitals reviewed.  Constitutional: She appears well-developed and well-nourished.   HENT:   Head: Normocephalic.   Eyes: Conjunctivae are normal.   Cardiovascular: Normal rate and intact distal pulses.   Pulmonary/Chest: No respiratory distress.   Abdominal: Soft. There is no tenderness.   Musculoskeletal: Normal range of motion.   Neurological: She is alert and oriented to person, place, and time. She has normal strength. No sensory deficit.   Skin: Skin is warm and dry. Abscess noted.   There is a pointing fluctuant cutaneous abscess to the left axilla measuring approximately 4 cm in total diameter.    Psychiatric: She has a normal mood and affect. Her behavior is normal.             ED Course   I & D - Incision and Drainage  Date/Time: 10/24/2019 4:53 PM  Performed by: Garret Lilly PA-C  Authorized by: Helio Carlson MD   Consent Done: Yes  Consent: Verbal consent obtained.  Risks and benefits: risks, benefits and alternatives were discussed  Consent given by: patient  Patient understanding: patient states understanding of the procedure being performed  Type: abscess  Body area: upper extremity    Anesthesia:  Local Anesthetic: lidocaine 2% without epinephrine  Scalpel size: 11  Incision type: single straight  Complexity: complex  Drainage: purulent  Drainage amount: moderate  Wound treatment: incision,  drainage,  wound packed and  deloculation  Packing material: 1/4 in gauze  Complications: No  Specimens: No  Patient tolerance: Patient tolerated the procedure well with no immediate complications        Labs Reviewed - No data to display       Imaging Results    None          Medical Decision Making:   Initial Assessment:   Acute episode of recurrent abscess to left axilla   Differential Diagnosis:   Abscess, cellulitis, cyst, hydradenitis, sebaccous gland infection, epidermoid cyst, lymphadenopathy, etc   ED Management:  I & D performed and abscess packed   Bactrim  started in ER and prescribed   Analgesic provided   Concern for hydradenitis based on history and exam - general surgery follow up provided   Pt advised on proper wound care and and agrees to see primary care in the next 24-36 hours for wound re-check and packing removal   Pt agrees to return to the ER right away if worse in any way                       Clinical Impression:       ICD-10-CM ICD-9-CM   1. Abscess of left axilla L02.412 682.3   2. Hydradenitis L73.2 705.83         Disposition:   Disposition: Discharged  Condition: Stable                        Garret Lilly PA-C  10/25/19 0123

## 2019-10-24 NOTE — ED TRIAGE NOTES
Patient states abscess left axilla, states frequent abscess for same, states they usually go away on their own. Last I and D of axilla 10 years ago. Denies fevers.

## 2020-10-27 DIAGNOSIS — E04.2 MULTIPLE THYROID NODULES: Primary | ICD-10-CM

## 2020-11-05 ENCOUNTER — HOSPITAL ENCOUNTER (OUTPATIENT)
Dept: RADIOLOGY | Facility: HOSPITAL | Age: 45
Discharge: HOME | End: 2020-11-05
Attending: OTOLARYNGOLOGY
Payer: COMMERCIAL

## 2020-11-05 DIAGNOSIS — E04.2 MULTIPLE THYROID NODULES: ICD-10-CM

## 2020-11-05 PROCEDURE — 76536 US SOFT TISSUE HEAD NECK THYROID: ICD-10-PCS | Mod: 26,,, | Performed by: INTERNAL MEDICINE

## 2020-11-05 PROCEDURE — 76536 US EXAM OF HEAD AND NECK: CPT | Mod: TC

## 2020-11-05 PROCEDURE — 76536 US EXAM OF HEAD AND NECK: CPT | Mod: 26,,, | Performed by: INTERNAL MEDICINE

## 2020-11-11 ENCOUNTER — OFFICE VISIT (OUTPATIENT)
Dept: OTOLARYNGOLOGY | Facility: CLINIC | Age: 45
End: 2020-11-11
Payer: COMMERCIAL

## 2020-11-11 VITALS
DIASTOLIC BLOOD PRESSURE: 73 MMHG | BODY MASS INDEX: 31.64 KG/M2 | WEIGHT: 173 LBS | SYSTOLIC BLOOD PRESSURE: 113 MMHG | HEART RATE: 75 BPM

## 2020-11-11 DIAGNOSIS — E04.2 MULTINODULAR GOITER: Primary | ICD-10-CM

## 2020-11-11 PROCEDURE — 99999 PR PBB SHADOW E&M-EST. PATIENT-LVL II: ICD-10-PCS | Mod: PBBFAC,,, | Performed by: OTOLARYNGOLOGY

## 2020-11-11 PROCEDURE — 99214 PR OFFICE/OUTPT VISIT, EST, LEVL IV, 30-39 MIN: ICD-10-PCS | Mod: S$GLB,,, | Performed by: OTOLARYNGOLOGY

## 2020-11-11 PROCEDURE — 3008F BODY MASS INDEX DOCD: CPT | Mod: CPTII,S$GLB,, | Performed by: OTOLARYNGOLOGY

## 2020-11-11 PROCEDURE — 3008F PR BODY MASS INDEX (BMI) DOCUMENTED: ICD-10-PCS | Mod: CPTII,S$GLB,, | Performed by: OTOLARYNGOLOGY

## 2020-11-11 PROCEDURE — 99214 OFFICE O/P EST MOD 30 MIN: CPT | Mod: S$GLB,,, | Performed by: OTOLARYNGOLOGY

## 2020-11-11 PROCEDURE — 99999 PR PBB SHADOW E&M-EST. PATIENT-LVL II: CPT | Mod: PBBFAC,,, | Performed by: OTOLARYNGOLOGY

## 2020-11-11 NOTE — PROGRESS NOTES
Chief Complaint   Patient presents with    thyroid mass         45 y.o. female presents with longstanding history of thyroid goiter. Here for follow up after recent yearly ultrasound, stable. Denies dysphagia, orthopnea or voice changes. She has lost weight so her thyroid gland is more noticeable. Otherwise, it does not bother her.         Review of Systems     Constitutional: Negative for fatigue and unexpected weight change.   HENT: per HPI.  Eyes: Negative for visual disturbance.   Respiratory: Negative for shortness of breath, hemoptysis  Cardiovascular: Negative for chest pain and palpitations.   Genitourinary: Negative for dysuria and difficulty urinating.   Musculoskeletal: Negative for decreased ROM, back pain.   Skin: Negative for rash, sunburn, itching.   Neurological: Negative for dizziness and seizures.   Hematological: Negative for adenopathy. Does not bruise/bleed easily.   Psychiatric/Behavioral: Negative for agitation. The patient is not nervous/anxious.   Endocrine: Negative for rapid weight loss/weight gain, heat/cold intolerance.     No past medical history on file.    No past surgical history on file.    family history is not on file.    Pt  reports that she has never smoked. She has never used smokeless tobacco. She reports that she does not drink alcohol or use drugs.    Review of patient's allergies indicates:  No Known Allergies     Physical Exam    Vitals:    11/11/20 0813   BP: 113/73   Pulse: 75     Body mass index is 31.64 kg/m².    Physical Exam  Vitals signs and nursing note reviewed.   Constitutional:       General: She is not in acute distress.     Appearance: She is well-developed. She is not diaphoretic.   HENT:      Head: Normocephalic and atraumatic.      Right Ear: Hearing, tympanic membrane, ear canal and external ear normal. No decreased hearing noted. No middle ear effusion. Tympanic membrane has normal mobility.      Left Ear: Hearing, tympanic membrane, ear canal and  external ear normal. No decreased hearing noted.  No middle ear effusion. Tympanic membrane has normal mobility.      Nose: Nose normal.   Eyes:      General: Lids are normal.         Right eye: No discharge.         Left eye: No discharge.      Conjunctiva/sclera: Conjunctivae normal.      Pupils: Pupils are equal, round, and reactive to light.   Neck:      Musculoskeletal: Normal range of motion and neck supple. No edema or erythema.      Thyroid: Thyromegaly present.      Trachea: Trachea and phonation normal. No tracheal tenderness or tracheal deviation.   Cardiovascular:      Heart sounds: Normal heart sounds.   Pulmonary:      Breath sounds: Normal breath sounds. No stridor.   Abdominal:      Palpations: Abdomen is soft.   Lymphadenopathy:      Cervical: No cervical adenopathy.   Skin:     General: Skin is warm and dry.      Coloration: Skin is not pale.      Findings: No erythema or rash.   Neurological:      Mental Status: She is alert and oriented to person, place, and time.       Studies reviewed:  US Neck 5/18/16:  Findings  The thyroid is again noted to be enlarged heterogeneous with  appearance compatible with multinodular goiter, grossly unchanged  from 2009. Numerous nodules are again noted, for reference:  Hyperechoic circumscribed nodule in the isthmus with purely cystic  components measures 1.7 x 1.4 centimeter. Circumscribed hyperechoic  nodule in the right upper pole measures 1 x 0.7 cm. Additional  similar-appearing right upper lobe nodule measures 1.1 x 1.1 cm. The  dominant predominantly hyperechoic nodule which was previously  biopsied and shown to be benign in the right lower pole measures 4-5  centimeters and demonstrates peripheral and central vascularity,  grossly unchanged as remeasured. Similar-appearing nodule adjacent to  the isthmus in the left interpolar region measures 3.7 x 2.9  centimeters containing scattered cystic changes. Additional abutting  nodule in the left interpolar  region which shows greater than 50  percent cystic composition measures 4-5 cm. Additional  similar-appearing nodule more inferiorly in the lower pole on the  left measures 3.1 x 2 cm. All nodules show a thin hypoechoic halo and  are unchanged as reevaluated on prior ultrasound of 2009.    The RIGHT LOBE measures 7 x 3.1 x 2.7 cm.  The LEFT LOBE measures 6.4 x 3.4 x 3.1 cm.  The ISTHMUS measures 12 mm.    No lymphadenopathy is noted.     FNA thyroid 04/16:  These findings are consistent with a benign  thyroid nodule    Thyroid US 11/5/20:  Overall stable appearing thyroid.  Four most suspicious nodules are documented for follow-up purposes.  Recommend ultrasound follow-up in 1 year.    Assessment     1. Multinodular goiter          Plan  In summary, Ms. Castro is a 45 year old female with longstanding history of MNG. Recent US stable and she remains without symptoms. Recommend repeat US in one year or sooner if symptoms occur. All questions were answered and she is in agreement with the plan.

## 2021-04-16 ENCOUNTER — PATIENT MESSAGE (OUTPATIENT)
Dept: RESEARCH | Facility: HOSPITAL | Age: 46
End: 2021-04-16

## 2022-02-08 ENCOUNTER — OFFICE VISIT (OUTPATIENT)
Dept: OTOLARYNGOLOGY | Facility: CLINIC | Age: 47
End: 2022-02-08
Payer: COMMERCIAL

## 2022-02-08 VITALS — DIASTOLIC BLOOD PRESSURE: 75 MMHG | HEART RATE: 87 BPM | SYSTOLIC BLOOD PRESSURE: 116 MMHG

## 2022-02-08 DIAGNOSIS — E04.2 MULTINODULAR GOITER: Primary | ICD-10-CM

## 2022-02-08 PROCEDURE — 3078F PR MOST RECENT DIASTOLIC BLOOD PRESSURE < 80 MM HG: ICD-10-PCS | Mod: CPTII,S$GLB,, | Performed by: OTOLARYNGOLOGY

## 2022-02-08 PROCEDURE — 99214 OFFICE O/P EST MOD 30 MIN: CPT | Mod: S$GLB,,, | Performed by: OTOLARYNGOLOGY

## 2022-02-08 PROCEDURE — 3078F DIAST BP <80 MM HG: CPT | Mod: CPTII,S$GLB,, | Performed by: OTOLARYNGOLOGY

## 2022-02-08 PROCEDURE — 3074F PR MOST RECENT SYSTOLIC BLOOD PRESSURE < 130 MM HG: ICD-10-PCS | Mod: CPTII,S$GLB,, | Performed by: OTOLARYNGOLOGY

## 2022-02-08 PROCEDURE — 3074F SYST BP LT 130 MM HG: CPT | Mod: CPTII,S$GLB,, | Performed by: OTOLARYNGOLOGY

## 2022-02-08 PROCEDURE — 99214 PR OFFICE/OUTPT VISIT, EST, LEVL IV, 30-39 MIN: ICD-10-PCS | Mod: S$GLB,,, | Performed by: OTOLARYNGOLOGY

## 2022-02-08 PROCEDURE — 99999 PR PBB SHADOW E&M-EST. PATIENT-LVL II: ICD-10-PCS | Mod: PBBFAC,,, | Performed by: OTOLARYNGOLOGY

## 2022-02-08 PROCEDURE — 99999 PR PBB SHADOW E&M-EST. PATIENT-LVL II: CPT | Mod: PBBFAC,,, | Performed by: OTOLARYNGOLOGY

## 2022-02-08 NOTE — PROGRESS NOTES
Chief Complaint   Patient presents with    Goiter         46 y.o. female presents with longstanding history of thyroid goiter. Here for follow up and feels that the left side of her thyroid is more enlarged than a year ago. Last US 11/2020. Denies dysphagia, orthopnea or voice changes. She feels that her thyroid gland is more noticeable. Otherwise, it does not bother her.         Review of Systems     Constitutional: Negative for fatigue and unexpected weight change.   HENT: per HPI.  Eyes: Negative for visual disturbance.   Respiratory: Negative for shortness of breath, hemoptysis  Cardiovascular: Negative for chest pain and palpitations.   Genitourinary: Negative for dysuria and difficulty urinating.   Musculoskeletal: Negative for decreased ROM, back pain.   Skin: Negative for rash, sunburn, itching.   Neurological: Negative for dizziness and seizures.   Hematological: Negative for adenopathy. Does not bruise/bleed easily.   Psychiatric/Behavioral: Negative for agitation. The patient is not nervous/anxious.   Endocrine: Negative for rapid weight loss/weight gain, heat/cold intolerance.     No past medical history on file.    No past surgical history on file.    family history is not on file.    Pt  reports that she has never smoked. She has never used smokeless tobacco. She reports that she does not drink alcohol and does not use drugs.    Review of patient's allergies indicates:  No Known Allergies     Physical Exam    Vitals:    02/08/22 1555   BP: 116/75   Pulse: 87     There is no height or weight on file to calculate BMI.    Physical Exam  Vitals and nursing note reviewed.   Constitutional:       General: She is not in acute distress.     Appearance: She is well-developed. She is not diaphoretic.   HENT:      Head: Normocephalic and atraumatic.      Right Ear: Hearing, tympanic membrane, ear canal and external ear normal. No decreased hearing noted. No middle ear effusion. Tympanic membrane has normal  mobility.      Left Ear: Hearing, tympanic membrane, ear canal and external ear normal. No decreased hearing noted.  No middle ear effusion. Tympanic membrane has normal mobility.      Nose: Nose normal.   Eyes:      General: Lids are normal.         Right eye: No discharge.         Left eye: No discharge.      Conjunctiva/sclera: Conjunctivae normal.      Pupils: Pupils are equal, round, and reactive to light.   Neck:      Thyroid: Thyromegaly (L>R) present.      Trachea: Trachea and phonation normal. No tracheal tenderness or tracheal deviation.   Cardiovascular:      Heart sounds: Normal heart sounds.   Pulmonary:      Breath sounds: Normal breath sounds. No stridor.   Abdominal:      Palpations: Abdomen is soft.   Musculoskeletal:      Cervical back: Normal range of motion and neck supple. No edema or erythema.   Lymphadenopathy:      Cervical: No cervical adenopathy.   Skin:     General: Skin is warm and dry.      Coloration: Skin is not pale.      Findings: No erythema or rash.   Neurological:      Mental Status: She is alert and oriented to person, place, and time.       Studies reviewed:  US Neck 5/18/16:  Findings  The thyroid is again noted to be enlarged heterogeneous with  appearance compatible with multinodular goiter, grossly unchanged  from 2009. Numerous nodules are again noted, for reference:  Hyperechoic circumscribed nodule in the isthmus with purely cystic  components measures 1.7 x 1.4 centimeter. Circumscribed hyperechoic  nodule in the right upper pole measures 1 x 0.7 cm. Additional  similar-appearing right upper lobe nodule measures 1.1 x 1.1 cm. The  dominant predominantly hyperechoic nodule which was previously  biopsied and shown to be benign in the right lower pole measures 4-5  centimeters and demonstrates peripheral and central vascularity,  grossly unchanged as remeasured. Similar-appearing nodule adjacent to  the isthmus in the left interpolar region measures 3.7 x 2.9  centimeters  containing scattered cystic changes. Additional abutting  nodule in the left interpolar region which shows greater than 50  percent cystic composition measures 4-5 cm. Additional  similar-appearing nodule more inferiorly in the lower pole on the  left measures 3.1 x 2 cm. All nodules show a thin hypoechoic halo and  are unchanged as reevaluated on prior ultrasound of 2009.    The RIGHT LOBE measures 7 x 3.1 x 2.7 cm.  The LEFT LOBE measures 6.4 x 3.4 x 3.1 cm.  The ISTHMUS measures 12 mm.    No lymphadenopathy is noted.     FNA thyroid 04/16:  These findings are consistent with a benign  thyroid nodule    Thyroid US 11/5/20:  Overall stable appearing thyroid.  Four most suspicious nodules are documented for follow-up purposes.  Recommend ultrasound follow-up in 1 year.    Assessment     1. Multinodular goiter          Plan  In summary, Ms. Castro is a 46 year old female with longstanding history of MNG. Previous US stable and she remains without symptoms. Recommend repeat US for further assessment and repeat biopsy as indicated.     She is considering surgery depending on the US findings. I explained the risks of thyroidectomy include, but are not limited to, infection, bleeding, scarring, failure to achieve the diagnosis, no evidence of cancer, recurrence, collection of blood or tissue fluid requiring drainage, injury to the recurrent laryngeal nerve with resultant temporary or permanent hoarseness (1% permanent risk with up to 10% temporary risk, greater in revision operations), injury to the superior laryngeal nerve with resultant loss of the upper register for singing or challenges with yelling, temporary or permanent hypocalcemia related to injury or devascularization of the parathyroid glands (less than 5% permanent, up to 30-60% when paratracheal dissection is accomplished, again greater in revision operations), and the need for additional procedures or therapies.  Time was allowed for questions, and all  questions were answered to the patient's apparent satisfaction. The risks of paratracheal lymph node dissection are included above.     If she does want to proceed with surgery, CT neck would be indicated to better assess mediastinal involvement. All questions were answered and she is in agreement with the plan.            WDL

## 2022-02-18 ENCOUNTER — HOSPITAL ENCOUNTER (OUTPATIENT)
Dept: RADIOLOGY | Facility: HOSPITAL | Age: 47
Discharge: HOME | End: 2022-02-18
Attending: OTOLARYNGOLOGY
Payer: COMMERCIAL

## 2022-02-18 DIAGNOSIS — E04.2 MULTINODULAR GOITER: ICD-10-CM

## 2022-02-18 PROCEDURE — 76536 US EXAM OF HEAD AND NECK: CPT | Mod: 26,,, | Performed by: RADIOLOGY

## 2022-02-18 PROCEDURE — 76536 US SOFT TISSUE HEAD NECK THYROID: ICD-10-PCS | Mod: 26,,, | Performed by: RADIOLOGY

## 2022-02-18 PROCEDURE — 76536 US EXAM OF HEAD AND NECK: CPT | Mod: TC

## 2022-02-23 ENCOUNTER — PATIENT MESSAGE (OUTPATIENT)
Dept: OTOLARYNGOLOGY | Facility: CLINIC | Age: 47
End: 2022-02-23
Payer: COMMERCIAL

## 2022-03-25 ENCOUNTER — PATIENT MESSAGE (OUTPATIENT)
Dept: OTOLARYNGOLOGY | Facility: CLINIC | Age: 47
End: 2022-03-25
Payer: COMMERCIAL

## 2022-04-18 ENCOUNTER — PATIENT MESSAGE (OUTPATIENT)
Dept: OTOLARYNGOLOGY | Facility: CLINIC | Age: 47
End: 2022-04-18
Payer: COMMERCIAL

## 2022-04-27 DIAGNOSIS — E04.2 MULTINODULAR GOITER: Primary | ICD-10-CM

## 2022-05-04 ENCOUNTER — HOSPITAL ENCOUNTER (OUTPATIENT)
Dept: RADIOLOGY | Facility: OTHER | Age: 47
Discharge: HOME | End: 2022-05-04
Attending: OTOLARYNGOLOGY
Payer: COMMERCIAL

## 2022-05-04 DIAGNOSIS — E04.2 MULTINODULAR GOITER: ICD-10-CM

## 2022-05-04 PROCEDURE — 88172 CYTP DX EVAL FNA 1ST EA SITE: CPT | Performed by: PATHOLOGY

## 2022-05-04 PROCEDURE — 88177 CYTP FNA EVAL EA ADDL: CPT | Mod: 26,,, | Performed by: PATHOLOGY

## 2022-05-04 PROCEDURE — 10005 FNA BX W/US GDN 1ST LES: CPT | Mod: ,,, | Performed by: RADIOLOGY

## 2022-05-04 PROCEDURE — 88173 CYTOPATH EVAL FNA REPORT: CPT | Mod: 59 | Performed by: PATHOLOGY

## 2022-05-04 PROCEDURE — 88177 PR  EVALUATION OF FNA SMEAR TO DETERMINE ADEQUACY, EA ADD EVAL: ICD-10-PCS | Mod: 26,,, | Performed by: PATHOLOGY

## 2022-05-04 PROCEDURE — 10160 IR ABSCESS ASPIRATION: ICD-10-PCS | Mod: ,,, | Performed by: RADIOLOGY

## 2022-05-04 PROCEDURE — 88172 PR  EVALUATION OF FNA SMEAR TO DETERMINE ADEQUACY, FIRST EVAL: ICD-10-PCS | Mod: 26,,, | Performed by: PATHOLOGY

## 2022-05-04 PROCEDURE — 88177 CYTP FNA EVAL EA ADDL: CPT | Mod: 59 | Performed by: PATHOLOGY

## 2022-05-04 PROCEDURE — 10005 IR US FINE NEEDLE ASPIRATION BIOPSY, FIRST LESION: ICD-10-PCS | Mod: ,,, | Performed by: RADIOLOGY

## 2022-05-04 PROCEDURE — 10006 FNA BX W/US GDN EA ADDL: CPT

## 2022-05-04 PROCEDURE — 77002 NEEDLE LOCALIZATION BY XRAY: CPT | Mod: TC

## 2022-05-04 PROCEDURE — 10006 FNA BX W/US GDN EA ADDL: CPT | Mod: ,,, | Performed by: RADIOLOGY

## 2022-05-04 PROCEDURE — 10160 PNXR ASPIR ABSC HMTMA BULLA: CPT | Mod: ,,, | Performed by: RADIOLOGY

## 2022-05-04 PROCEDURE — 88172 CYTP DX EVAL FNA 1ST EA SITE: CPT | Mod: 26,,, | Performed by: PATHOLOGY

## 2022-05-04 PROCEDURE — 77002 IR ABSCESS ASPIRATION: ICD-10-PCS | Mod: 26,59,, | Performed by: RADIOLOGY

## 2022-05-04 PROCEDURE — 25000003 PHARM REV CODE 250: Performed by: OTOLARYNGOLOGY

## 2022-05-04 PROCEDURE — 77002 NEEDLE LOCALIZATION BY XRAY: CPT | Mod: 26,59,, | Performed by: RADIOLOGY

## 2022-05-04 PROCEDURE — 10006 IR US FINE NEEDLE ASPIRATION BIOPSY , EA ADDL LESION: ICD-10-PCS | Mod: ,,, | Performed by: RADIOLOGY

## 2022-05-04 PROCEDURE — 88173 CYTOPATH EVAL FNA REPORT: CPT | Mod: 26,,, | Performed by: PATHOLOGY

## 2022-05-04 PROCEDURE — 88173 PR  INTERPRETATION OF FNA SMEAR: ICD-10-PCS | Mod: 26,,, | Performed by: PATHOLOGY

## 2022-05-04 PROCEDURE — 10005 FNA BX W/US GDN 1ST LES: CPT

## 2022-05-04 RX ORDER — LIDOCAINE HYDROCHLORIDE 10 MG/ML
5 INJECTION INFILTRATION; PERINEURAL ONCE
Status: COMPLETED | OUTPATIENT
Start: 2022-05-04 | End: 2022-05-04

## 2022-05-04 RX ADMIN — LIDOCAINE HYDROCHLORIDE 2.5 ML: 10 INJECTION, SOLUTION EPIDURAL; INFILTRATION; INTRACAUDAL; PERINEURAL at 11:05

## 2022-05-09 LAB
ADEQUACY: ABNORMAL
FINAL PATHOLOGIC DIAGNOSIS: ABNORMAL
Lab: ABNORMAL

## 2022-05-20 ENCOUNTER — PATIENT MESSAGE (OUTPATIENT)
Dept: OTOLARYNGOLOGY | Facility: CLINIC | Age: 47
End: 2022-05-20
Payer: COMMERCIAL

## 2022-05-23 DIAGNOSIS — E04.2 MULTIPLE THYROID NODULES: Primary | ICD-10-CM

## 2022-05-30 ENCOUNTER — TELEPHONE (OUTPATIENT)
Dept: OTOLARYNGOLOGY | Facility: CLINIC | Age: 47
End: 2022-05-30
Payer: COMMERCIAL

## 2022-11-21 ENCOUNTER — PATIENT MESSAGE (OUTPATIENT)
Dept: OTOLARYNGOLOGY | Facility: CLINIC | Age: 47
End: 2022-11-21
Payer: COMMERCIAL

## 2022-11-23 ENCOUNTER — PATIENT MESSAGE (OUTPATIENT)
Dept: OTOLARYNGOLOGY | Facility: CLINIC | Age: 47
End: 2022-11-23
Payer: COMMERCIAL

## 2022-11-23 DIAGNOSIS — E04.2 MULTIPLE THYROID NODULES: Primary | ICD-10-CM

## 2022-12-01 ENCOUNTER — HOSPITAL ENCOUNTER (OUTPATIENT)
Dept: RADIOLOGY | Facility: HOSPITAL | Age: 47
Discharge: HOME | End: 2022-12-01
Attending: OTOLARYNGOLOGY
Payer: COMMERCIAL

## 2022-12-01 DIAGNOSIS — E04.2 MULTIPLE THYROID NODULES: ICD-10-CM

## 2022-12-01 PROCEDURE — 70491 CT SOFT TISSUE NECK W/DYE: CPT | Mod: TC

## 2022-12-01 PROCEDURE — 25500020 PHARM REV CODE 255: Performed by: OTOLARYNGOLOGY

## 2022-12-01 PROCEDURE — 70491 CT SOFT TISSUE NECK W/DYE: CPT | Mod: 26,,, | Performed by: RADIOLOGY

## 2022-12-01 PROCEDURE — 70491 CT SOFT TISSUE NECK WITH CONTRAST: ICD-10-PCS | Mod: 26,,, | Performed by: RADIOLOGY

## 2022-12-01 RX ADMIN — IOHEXOL 75 ML: 350 INJECTION, SOLUTION INTRAVENOUS at 04:12

## 2022-12-06 ENCOUNTER — PATIENT MESSAGE (OUTPATIENT)
Dept: OTOLARYNGOLOGY | Facility: CLINIC | Age: 47
End: 2022-12-06
Payer: COMMERCIAL

## 2022-12-06 DIAGNOSIS — E04.2 MULTIPLE THYROID NODULES: Primary | ICD-10-CM

## 2022-12-13 ENCOUNTER — PATIENT MESSAGE (OUTPATIENT)
Dept: OTOLARYNGOLOGY | Facility: CLINIC | Age: 47
End: 2022-12-13
Payer: COMMERCIAL

## 2022-12-13 ENCOUNTER — HOSPITAL ENCOUNTER (OUTPATIENT)
Dept: RADIOLOGY | Facility: OTHER | Age: 47
Discharge: HOME | End: 2022-12-13
Attending: OTOLARYNGOLOGY
Payer: COMMERCIAL

## 2022-12-13 DIAGNOSIS — E04.2 MULTIPLE THYROID NODULES: ICD-10-CM

## 2022-12-13 PROCEDURE — 88177 CYTP FNA EVAL EA ADDL: CPT | Performed by: PATHOLOGY

## 2022-12-13 PROCEDURE — 88173 CYTOPATH EVAL FNA REPORT: CPT | Performed by: PATHOLOGY

## 2022-12-13 PROCEDURE — 88172 CYTP DX EVAL FNA 1ST EA SITE: CPT | Mod: 26,,, | Performed by: PATHOLOGY

## 2022-12-13 PROCEDURE — 88177 PR  EVALUATION OF FNA SMEAR TO DETERMINE ADEQUACY, EA ADD EVAL: ICD-10-PCS | Mod: 26,,, | Performed by: PATHOLOGY

## 2022-12-13 PROCEDURE — 88172 CYTP DX EVAL FNA 1ST EA SITE: CPT | Performed by: PATHOLOGY

## 2022-12-13 PROCEDURE — 88173 CYTOPATH EVAL FNA REPORT: CPT | Mod: 26,,, | Performed by: PATHOLOGY

## 2022-12-13 PROCEDURE — 10005 FNA BX W/US GDN 1ST LES: CPT | Mod: ,,, | Performed by: RADIOLOGY

## 2022-12-13 PROCEDURE — 88172 PR  EVALUATION OF FNA SMEAR TO DETERMINE ADEQUACY, FIRST EVAL: ICD-10-PCS | Mod: 26,,, | Performed by: PATHOLOGY

## 2022-12-13 PROCEDURE — 10005 IR US FINE NEEDLE ASPIRATION BIOPSY, FIRST LESION: ICD-10-PCS | Mod: ,,, | Performed by: RADIOLOGY

## 2022-12-13 PROCEDURE — 10005 FNA BX W/US GDN 1ST LES: CPT

## 2022-12-13 PROCEDURE — 88173 PR  INTERPRETATION OF FNA SMEAR: ICD-10-PCS | Mod: 26,,, | Performed by: PATHOLOGY

## 2022-12-13 PROCEDURE — 25000003 PHARM REV CODE 250: Performed by: OTOLARYNGOLOGY

## 2022-12-13 PROCEDURE — 88177 CYTP FNA EVAL EA ADDL: CPT | Mod: 26,,, | Performed by: PATHOLOGY

## 2022-12-13 RX ORDER — LIDOCAINE HYDROCHLORIDE 10 MG/ML
5 INJECTION INFILTRATION; PERINEURAL ONCE
Status: COMPLETED | OUTPATIENT
Start: 2022-12-13 | End: 2022-12-13

## 2022-12-13 RX ADMIN — LIDOCAINE HYDROCHLORIDE 5 ML: 10 INJECTION, SOLUTION EPIDURAL; INFILTRATION; INTRACAUDAL at 09:12

## 2022-12-13 NOTE — PROCEDURES
Radiology Post-Procedure Note    Pre Op Diagnosis: MNG  Post Op Diagnosis: Same    Procedure: R thyroid FNA    Procedure performed by: Ignacio Grider MD    Written Informed Consent Obtained: Yes  Specimen Removed: YES 6 FNA specimens  Estimated Blood Loss: Minimal    Findings:   FNA of R thyroid.  Medial and lateral nodules appeared relatively coalescent today and were biopsied together.    Patient tolerated procedure well.    @SIG@

## 2022-12-16 ENCOUNTER — PATIENT MESSAGE (OUTPATIENT)
Dept: OTOLARYNGOLOGY | Facility: CLINIC | Age: 47
End: 2022-12-16
Payer: COMMERCIAL

## 2022-12-16 LAB
ADEQUACY: ABNORMAL
FINAL PATHOLOGIC DIAGNOSIS: ABNORMAL
Lab: ABNORMAL

## 2022-12-19 ENCOUNTER — OFFICE VISIT (OUTPATIENT)
Dept: OTOLARYNGOLOGY | Facility: CLINIC | Age: 47
End: 2022-12-19
Payer: COMMERCIAL

## 2022-12-19 VITALS — BODY MASS INDEX: 31.09 KG/M2 | WEIGHT: 170 LBS

## 2022-12-19 DIAGNOSIS — E04.2 MULTINODULAR GOITER: Primary | ICD-10-CM

## 2022-12-19 PROCEDURE — 1159F PR MEDICATION LIST DOCUMENTED IN MEDICAL RECORD: ICD-10-PCS | Mod: CPTII,S$GLB,, | Performed by: OTOLARYNGOLOGY

## 2022-12-19 PROCEDURE — 3008F PR BODY MASS INDEX (BMI) DOCUMENTED: ICD-10-PCS | Mod: CPTII,S$GLB,, | Performed by: OTOLARYNGOLOGY

## 2022-12-19 PROCEDURE — 3008F BODY MASS INDEX DOCD: CPT | Mod: CPTII,S$GLB,, | Performed by: OTOLARYNGOLOGY

## 2022-12-19 PROCEDURE — 1159F MED LIST DOCD IN RCRD: CPT | Mod: CPTII,S$GLB,, | Performed by: OTOLARYNGOLOGY

## 2022-12-19 PROCEDURE — 99214 PR OFFICE/OUTPT VISIT, EST, LEVL IV, 30-39 MIN: ICD-10-PCS | Mod: S$GLB,,, | Performed by: OTOLARYNGOLOGY

## 2022-12-19 PROCEDURE — 99999 PR PBB SHADOW E&M-EST. PATIENT-LVL III: CPT | Mod: PBBFAC,,, | Performed by: OTOLARYNGOLOGY

## 2022-12-19 PROCEDURE — 99999 PR PBB SHADOW E&M-EST. PATIENT-LVL III: ICD-10-PCS | Mod: PBBFAC,,, | Performed by: OTOLARYNGOLOGY

## 2022-12-19 PROCEDURE — 99214 OFFICE O/P EST MOD 30 MIN: CPT | Mod: S$GLB,,, | Performed by: OTOLARYNGOLOGY

## 2022-12-19 NOTE — PROGRESS NOTES
Chief Complaint   Patient presents with    discuss pathology          47 y.o. female presents with longstanding history of thyroid goiter. Here for follow up and feels that the left side of her thyroid is more enlarged than a year ago. Last US 11/2020. Denies dysphagia, orthopnea or voice changes. She feels that her thyroid gland is more noticeable. Otherwise, it does not bother her.     Repeat right nodule biopsy with AUS. Here to discus her options. She would like to proceed with surgery.        Review of Systems     Constitutional: Negative for fatigue and unexpected weight change.   HENT: per HPI.  Eyes: Negative for visual disturbance.   Respiratory: Negative for shortness of breath, hemoptysis  Cardiovascular: Negative for chest pain and palpitations.   Genitourinary: Negative for dysuria and difficulty urinating.   Musculoskeletal: Negative for decreased ROM, back pain.   Skin: Negative for rash, sunburn, itching.   Neurological: Negative for dizziness and seizures.   Hematological: Negative for adenopathy. Does not bruise/bleed easily.   Psychiatric/Behavioral: Negative for agitation. The patient is not nervous/anxious.   Endocrine: Negative for rapid weight loss/weight gain, heat/cold intolerance.     No past medical history on file.    No past surgical history on file.    family history is not on file.    Pt  reports that she has never smoked. She has never used smokeless tobacco. She reports that she does not drink alcohol and does not use drugs.    Review of patient's allergies indicates:  No Known Allergies     Physical Exam    There were no vitals filed for this visit.    Body mass index is 31.09 kg/m².    Physical Exam  Vitals and nursing note reviewed.   Constitutional:       General: She is not in acute distress.     Appearance: She is well-developed. She is not diaphoretic.   HENT:      Head: Normocephalic and atraumatic.      Right Ear: Hearing, tympanic membrane, ear canal and external ear  normal. No decreased hearing noted. No middle ear effusion. Tympanic membrane has normal mobility.      Left Ear: Hearing, tympanic membrane, ear canal and external ear normal. No decreased hearing noted.  No middle ear effusion. Tympanic membrane has normal mobility.      Nose: Nose normal.   Eyes:      General: Lids are normal.         Right eye: No discharge.         Left eye: No discharge.      Conjunctiva/sclera: Conjunctivae normal.      Pupils: Pupils are equal, round, and reactive to light.   Neck:      Thyroid: Thyromegaly (L>R) present.      Trachea: Trachea and phonation normal. No tracheal tenderness or tracheal deviation.   Cardiovascular:      Heart sounds: Normal heart sounds.   Pulmonary:      Breath sounds: Normal breath sounds. No stridor.   Abdominal:      Palpations: Abdomen is soft.   Musculoskeletal:      Cervical back: Normal range of motion and neck supple. No edema or erythema.   Lymphadenopathy:      Cervical: No cervical adenopathy.   Skin:     General: Skin is warm and dry.      Coloration: Skin is not pale.      Findings: No erythema or rash.   Neurological:      Mental Status: She is alert and oriented to person, place, and time.     Studies reviewed:  US Neck 5/18/16:  Findings  The thyroid is again noted to be enlarged heterogeneous with  appearance compatible with multinodular goiter, grossly unchanged  from 2009. Numerous nodules are again noted, for reference:  Hyperechoic circumscribed nodule in the isthmus with purely cystic  components measures 1.7 x 1.4 centimeter. Circumscribed hyperechoic  nodule in the right upper pole measures 1 x 0.7 cm. Additional  similar-appearing right upper lobe nodule measures 1.1 x 1.1 cm. The  dominant predominantly hyperechoic nodule which was previously  biopsied and shown to be benign in the right lower pole measures 4-5  centimeters and demonstrates peripheral and central vascularity,  grossly unchanged as remeasured. Similar-appearing nodule  adjacent to  the isthmus in the left interpolar region measures 3.7 x 2.9  centimeters containing scattered cystic changes. Additional abutting  nodule in the left interpolar region which shows greater than 50  percent cystic composition measures 4-5 cm. Additional  similar-appearing nodule more inferiorly in the lower pole on the  left measures 3.1 x 2 cm. All nodules show a thin hypoechoic halo and  are unchanged as reevaluated on prior ultrasound of 2009.    The RIGHT LOBE measures 7 x 3.1 x 2.7 cm.  The LEFT LOBE measures 6.4 x 3.4 x 3.1 cm.  The ISTHMUS measures 12 mm.    No lymphadenopathy is noted.     FNA thyroid 04/16:  These findings are consistent with a benign  thyroid nodule    Thyroid US 11/5/20:  Overall stable appearing thyroid.  Four most suspicious nodules are documented for follow-up purposes.  Recommend ultrasound follow-up in 1 year.    CT neck 12/1/22:  Impression:     Multinodular goiter as above, with dominant 7.1 cm left thyroid lobe nodule, overall appearance corresponding with prior ultrasounds.  Thyroid has been biopsied previously, with pathology-reported atypia of unknown significance.    Assessment     1. Multinodular goiter          Plan  In summary, Ms. Castro is a 46 year old female with longstanding history of MNG. Now with multiple biopsies with AUS. We discussed genetic testing vs definitive surgery. She would like to proceed with total thyroidectomy. I explained the risks of thyroidectomy include, but are not limited to, infection, bleeding, scarring, failure to achieve the diagnosis, no evidence of cancer, recurrence, collection of blood or tissue fluid requiring drainage, injury to the recurrent laryngeal nerve with resultant temporary or permanent hoarseness (1% permanent risk with up to 10% temporary risk, greater in revision operations), injury to the superior laryngeal nerve with resultant loss of the upper register for singing or challenges with yelling, temporary or  permanent hypocalcemia related to injury or devascularization of the parathyroid glands (less than 5% permanent, up to 30-60% when paratracheal dissection is accomplished, again greater in revision operations), and the need for additional procedures or therapies. She is aware that she will need to take thyroid replacement hormone daily after the surgery. Time was allowed for questions, and all questions were answered to the patient's apparent satisfaction. The risks of paratracheal lymph node dissection are included above. All questions were answered and she is in agreement with the plan. Plan for or 1/9/23.

## 2022-12-27 ENCOUNTER — PATIENT MESSAGE (OUTPATIENT)
Dept: SURGERY | Facility: HOSPITAL | Age: 47
End: 2022-12-27
Payer: COMMERCIAL

## 2023-01-04 ENCOUNTER — PATIENT MESSAGE (OUTPATIENT)
Dept: SURGERY | Facility: HOSPITAL | Age: 48
End: 2023-01-04
Payer: COMMERCIAL

## 2023-01-06 ENCOUNTER — TELEPHONE (OUTPATIENT)
Dept: OTOLARYNGOLOGY | Facility: CLINIC | Age: 48
End: 2023-01-06
Payer: COMMERCIAL

## 2023-01-08 ENCOUNTER — ANESTHESIA EVENT (OUTPATIENT)
Dept: SURGERY | Facility: HOSPITAL | Age: 48
End: 2023-01-08
Payer: COMMERCIAL

## 2023-01-08 NOTE — ANESTHESIA PREPROCEDURE EVALUATION
[facility]  Anesthesia Pre-Operative Evaluation         Patient Name: Jina Castro  YOB: 1975  MRN: [id number]    SUBJECTIVE:     Pre-operative evaluation for Procedure(s) (LRB):  THYROIDECTOMY-NIM tube (Bilateral)     01/08/2023    Jina Castro is a 47 y.o. female w/ a significant PMHx of nontoxic multinodular goiter. S/p FNA w/ IR 12/13/22          Patient now presents for the above procedure(s).      CT Soft Tissue Neck w/ Contrast 12/1/22  Thyroid: Multinodular goiter, with large bilateral heterogeneous nodules better characterized on recent ultrasound.  Dominant nodule measures approximately 7.1 x 6.8 x 5.7 cm (axial series 2, image 102; sagittal series 602, image 178) in the left thyroid lobe, demonstrates mixed cystic/solid attenuation and peripheral macro calcifications, corresponding with prior ultrasound.  Mass effect with slight rightward deviation of the trachea at that level.      LDA: None documented.    Prev airway: None documented.    Drips: None documented.    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  No current outpatient medications on file.    No past surgical history on file.    Social History     Socioeconomic History    Marital status: Single   Tobacco Use    Smoking status: Never    Smokeless tobacco: Never   Substance and Sexual Activity    Alcohol use: No    Drug use: No       OBJECTIVE:     Vital Signs Range (Last 24H):         Significant Labs:  Lab Results   Component Value Date    WBC 10.84 03/22/2019    HGB 12.1 03/22/2019    HCT 36.6 (L) 03/22/2019     03/22/2019    CHOL 142 10/25/2018    TRIG 39 10/25/2018    HDL 39 (L) 10/25/2018    ALT 7 (L)  03/22/2019    AST 12 03/22/2019     03/22/2019    K 4.5 03/22/2019     03/22/2019    CREATININE 0.8 12/01/2022    BUN 16 03/22/2019    CO2 25 03/22/2019    TSH 0.661 10/25/2018       Diagnostic Studies: No relevant studies.    EKG:   No results found for this or any previous visit.    2D ECHO:  TTE:  No results found for this or any previous visit.    TRISH:  No results found for this or any previous visit.    ASSESSMENT/PLAN:           Pre-op Assessment    I have reviewed the Patient Summary Reports.     I have reviewed the Nursing Notes. I have reviewed the NPO Status.   I have reviewed the Medications.     Review of Systems  Anesthesia Hx:  No previous Anesthesia  Neg history of prior surgery. Denies Family Hx of Anesthesia complications.    Social:  Non-Smoker    Hematology/Oncology:         -- Denies Anemia: Denies Current/Recent Cancer   Cardiovascular:  Cardiovascular Normal     Pulmonary:  Pulmonary Normal    Renal/:  Renal/ Normal     Neurological:  Neurology Normal    Endocrine:   Multinodular nontoxic goiter   Psych:   Denies Psychiatric History. denies anxiety denies depression          Physical Exam  General: Well nourished, Cooperative, Alert and Oriented    Airway:  Mallampati: III / II  Mouth Opening: Normal  TM Distance: Normal  Neck ROM: Normal ROM    Dental:  Intact    Chest/Lungs:  Normal Respiratory Rate    Heart:  Rate: Normal        Anesthesia Plan  Type of Anesthesia, risks & benefits discussed:    Anesthesia Type: Gen ETT  Intra-op Monitoring Plan: Standard ASA Monitors and Art Line  Post Op Pain Control Plan: multimodal analgesia and IV/PO Opioids PRN  Induction:  IV  Airway Plan: Video and Direct, Post-Induction  Informed Consent: Informed consent signed with the Patient and all parties understand the risks and agree with anesthesia plan.  All questions answered.   ASA Score: 2  Day of Surgery Review of History & Physical: H&P Update referred to the surgeon/provider.    Ready  For Surgery From Anesthesia Perspective.     .

## 2023-01-09 ENCOUNTER — ANESTHESIA (OUTPATIENT)
Dept: SURGERY | Facility: HOSPITAL | Age: 48
End: 2023-01-09
Payer: COMMERCIAL

## 2023-01-09 ENCOUNTER — HOSPITAL ENCOUNTER (OUTPATIENT)
Facility: HOSPITAL | Age: 48
Discharge: HOME | End: 2023-01-10
Attending: OTOLARYNGOLOGY | Admitting: OTOLARYNGOLOGY
Payer: COMMERCIAL

## 2023-01-09 DIAGNOSIS — E04.2 MULTINODULAR GOITER: Primary | ICD-10-CM

## 2023-01-09 LAB
B-HCG UR QL: NEGATIVE
CTP QC/QA: YES

## 2023-01-09 PROCEDURE — 88307 PR  SURG PATH,LEVEL V: ICD-10-PCS | Mod: 26,,, | Performed by: PATHOLOGY

## 2023-01-09 PROCEDURE — 27201423 OPTIME MED/SURG SUP & DEVICES STERILE SUPPLY: Performed by: OTOLARYNGOLOGY

## 2023-01-09 PROCEDURE — 71000016 HC POSTOP RECOV ADDL HR: Performed by: OTOLARYNGOLOGY

## 2023-01-09 PROCEDURE — 71000033 HC RECOVERY, INTIAL HOUR: Performed by: OTOLARYNGOLOGY

## 2023-01-09 PROCEDURE — 60220 PR THYROID LOBECTOMY,UNILAT: ICD-10-PCS | Mod: ,,, | Performed by: OTOLARYNGOLOGY

## 2023-01-09 PROCEDURE — 36000706: Performed by: OTOLARYNGOLOGY

## 2023-01-09 PROCEDURE — 81025 URINE PREGNANCY TEST: CPT | Performed by: OTOLARYNGOLOGY

## 2023-01-09 PROCEDURE — 25000003 PHARM REV CODE 250: Performed by: STUDENT IN AN ORGANIZED HEALTH CARE EDUCATION/TRAINING PROGRAM

## 2023-01-09 PROCEDURE — 88307 TISSUE EXAM BY PATHOLOGIST: CPT | Performed by: PATHOLOGY

## 2023-01-09 PROCEDURE — 37000009 HC ANESTHESIA EA ADD 15 MINS: Performed by: OTOLARYNGOLOGY

## 2023-01-09 PROCEDURE — C1729 CATH, DRAINAGE: HCPCS | Performed by: OTOLARYNGOLOGY

## 2023-01-09 PROCEDURE — 71000015 HC POSTOP RECOV 1ST HR: Performed by: OTOLARYNGOLOGY

## 2023-01-09 PROCEDURE — 37000008 HC ANESTHESIA 1ST 15 MINUTES: Performed by: OTOLARYNGOLOGY

## 2023-01-09 PROCEDURE — 25000003 PHARM REV CODE 250: Performed by: OTOLARYNGOLOGY

## 2023-01-09 PROCEDURE — D9220A PRA ANESTHESIA: ICD-10-PCS | Mod: ,,, | Performed by: ANESTHESIOLOGY

## 2023-01-09 PROCEDURE — 63600175 PHARM REV CODE 636 W HCPCS

## 2023-01-09 PROCEDURE — 60220 PARTIAL REMOVAL OF THYROID: CPT | Mod: ,,, | Performed by: OTOLARYNGOLOGY

## 2023-01-09 PROCEDURE — 25000003 PHARM REV CODE 250

## 2023-01-09 PROCEDURE — 36000707: Performed by: OTOLARYNGOLOGY

## 2023-01-09 PROCEDURE — 94761 N-INVAS EAR/PLS OXIMETRY MLT: CPT

## 2023-01-09 PROCEDURE — 88307 TISSUE EXAM BY PATHOLOGIST: CPT | Mod: 26,,, | Performed by: PATHOLOGY

## 2023-01-09 PROCEDURE — D9220A PRA ANESTHESIA: Mod: ,,, | Performed by: ANESTHESIOLOGY

## 2023-01-09 RX ORDER — DEXAMETHASONE SODIUM PHOSPHATE 4 MG/ML
INJECTION, SOLUTION INTRA-ARTICULAR; INTRALESIONAL; INTRAMUSCULAR; INTRAVENOUS; SOFT TISSUE
Status: DISCONTINUED | OUTPATIENT
Start: 2023-01-09 | End: 2023-01-09

## 2023-01-09 RX ORDER — ACETAMINOPHEN 325 MG/1
650 TABLET ORAL EVERY 8 HOURS PRN
Status: DISCONTINUED | OUTPATIENT
Start: 2023-01-09 | End: 2023-01-10 | Stop reason: HOSPADM

## 2023-01-09 RX ORDER — PHENYLEPHRINE HCL IN 0.9% NACL 1 MG/10 ML
SYRINGE (ML) INTRAVENOUS
Status: DISCONTINUED | OUTPATIENT
Start: 2023-01-09 | End: 2023-01-09

## 2023-01-09 RX ORDER — FENTANYL CITRATE 50 UG/ML
INJECTION, SOLUTION INTRAMUSCULAR; INTRAVENOUS
Status: DISCONTINUED | OUTPATIENT
Start: 2023-01-09 | End: 2023-01-09

## 2023-01-09 RX ORDER — MIDAZOLAM HYDROCHLORIDE 1 MG/ML
INJECTION, SOLUTION INTRAMUSCULAR; INTRAVENOUS
Status: DISCONTINUED | OUTPATIENT
Start: 2023-01-09 | End: 2023-01-09

## 2023-01-09 RX ORDER — TALC
6 POWDER (GRAM) TOPICAL NIGHTLY PRN
Status: DISCONTINUED | OUTPATIENT
Start: 2023-01-09 | End: 2023-01-10 | Stop reason: HOSPADM

## 2023-01-09 RX ORDER — SODIUM CHLORIDE 0.9 % (FLUSH) 0.9 %
10 SYRINGE (ML) INJECTION
Status: DISCONTINUED | OUTPATIENT
Start: 2023-01-09 | End: 2023-01-10 | Stop reason: HOSPADM

## 2023-01-09 RX ORDER — HALOPERIDOL 5 MG/ML
0.5 INJECTION INTRAMUSCULAR EVERY 10 MIN PRN
Status: DISCONTINUED | OUTPATIENT
Start: 2023-01-09 | End: 2023-01-09

## 2023-01-09 RX ORDER — PROPOFOL 10 MG/ML
VIAL (ML) INTRAVENOUS
Status: DISCONTINUED | OUTPATIENT
Start: 2023-01-09 | End: 2023-01-09

## 2023-01-09 RX ORDER — SUCCINYLCHOLINE CHLORIDE 20 MG/ML
INJECTION INTRAMUSCULAR; INTRAVENOUS
Status: DISCONTINUED | OUTPATIENT
Start: 2023-01-09 | End: 2023-01-09

## 2023-01-09 RX ORDER — ONDANSETRON 2 MG/ML
INJECTION INTRAMUSCULAR; INTRAVENOUS
Status: DISCONTINUED | OUTPATIENT
Start: 2023-01-09 | End: 2023-01-09

## 2023-01-09 RX ORDER — HYDROCODONE BITARTRATE AND ACETAMINOPHEN 5; 325 MG/1; MG/1
1 TABLET ORAL EVERY 4 HOURS PRN
Status: DISCONTINUED | OUTPATIENT
Start: 2023-01-09 | End: 2023-01-10 | Stop reason: HOSPADM

## 2023-01-09 RX ORDER — CEPHALEXIN 500 MG/1
500 CAPSULE ORAL EVERY 6 HOURS
Status: DISCONTINUED | OUTPATIENT
Start: 2023-01-09 | End: 2023-01-10 | Stop reason: HOSPADM

## 2023-01-09 RX ORDER — MORPHINE SULFATE 2 MG/ML
2 INJECTION, SOLUTION INTRAMUSCULAR; INTRAVENOUS EVERY 4 HOURS PRN
Status: DISCONTINUED | OUTPATIENT
Start: 2023-01-09 | End: 2023-01-10 | Stop reason: HOSPADM

## 2023-01-09 RX ORDER — PHENYLEPHRINE HYDROCHLORIDE 10 MG/ML
INJECTION INTRAVENOUS CONTINUOUS PRN
Status: DISCONTINUED | OUTPATIENT
Start: 2023-01-09 | End: 2023-01-09

## 2023-01-09 RX ORDER — HYDROMORPHONE HYDROCHLORIDE 1 MG/ML
0.2 INJECTION, SOLUTION INTRAMUSCULAR; INTRAVENOUS; SUBCUTANEOUS EVERY 5 MIN PRN
Status: DISCONTINUED | OUTPATIENT
Start: 2023-01-09 | End: 2023-01-09

## 2023-01-09 RX ORDER — HYDROCODONE BITARTRATE AND ACETAMINOPHEN 10; 325 MG/1; MG/1
1 TABLET ORAL EVERY 4 HOURS PRN
Status: DISCONTINUED | OUTPATIENT
Start: 2023-01-09 | End: 2023-01-10 | Stop reason: HOSPADM

## 2023-01-09 RX ORDER — PROCHLORPERAZINE EDISYLATE 5 MG/ML
5 INJECTION INTRAMUSCULAR; INTRAVENOUS EVERY 6 HOURS PRN
Status: DISCONTINUED | OUTPATIENT
Start: 2023-01-09 | End: 2023-01-10 | Stop reason: HOSPADM

## 2023-01-09 RX ORDER — LIDOCAINE HYDROCHLORIDE AND EPINEPHRINE 10; 10 MG/ML; UG/ML
INJECTION, SOLUTION INFILTRATION; PERINEURAL
Status: DISCONTINUED | OUTPATIENT
Start: 2023-01-09 | End: 2023-01-09 | Stop reason: HOSPADM

## 2023-01-09 RX ORDER — ONDANSETRON 2 MG/ML
4 INJECTION INTRAMUSCULAR; INTRAVENOUS EVERY 6 HOURS PRN
Status: DISCONTINUED | OUTPATIENT
Start: 2023-01-09 | End: 2023-01-10 | Stop reason: HOSPADM

## 2023-01-09 RX ORDER — CEFAZOLIN SODIUM 1 G/3ML
INJECTION, POWDER, FOR SOLUTION INTRAMUSCULAR; INTRAVENOUS
Status: DISCONTINUED | OUTPATIENT
Start: 2023-01-09 | End: 2023-01-09

## 2023-01-09 RX ORDER — KETAMINE HCL IN 0.9 % NACL 50 MG/5 ML
SYRINGE (ML) INTRAVENOUS
Status: DISCONTINUED | OUTPATIENT
Start: 2023-01-09 | End: 2023-01-09

## 2023-01-09 RX ORDER — ACETAMINOPHEN 325 MG/1
650 TABLET ORAL EVERY 4 HOURS PRN
Status: DISCONTINUED | OUTPATIENT
Start: 2023-01-09 | End: 2023-01-10 | Stop reason: HOSPADM

## 2023-01-09 RX ORDER — SODIUM CHLORIDE 0.9 % (FLUSH) 0.9 %
10 SYRINGE (ML) INJECTION
Status: DISCONTINUED | OUTPATIENT
Start: 2023-01-09 | End: 2023-01-09

## 2023-01-09 RX ADMIN — SODIUM CHLORIDE: 0.9 INJECTION, SOLUTION INTRAVENOUS at 08:01

## 2023-01-09 RX ADMIN — HYDROMORPHONE HYDROCHLORIDE 0.5 MG: 1 INJECTION, SOLUTION INTRAMUSCULAR; INTRAVENOUS; SUBCUTANEOUS at 09:01

## 2023-01-09 RX ADMIN — Medication 30 MG: at 09:01

## 2023-01-09 RX ADMIN — DEXAMETHASONE SODIUM PHOSPHATE 4 MG: 4 INJECTION INTRA-ARTICULAR; INTRALESIONAL; INTRAMUSCULAR; INTRAVENOUS; SOFT TISSUE at 09:01

## 2023-01-09 RX ADMIN — Medication 100 MCG: at 09:01

## 2023-01-09 RX ADMIN — Medication 100 MCG: at 10:01

## 2023-01-09 RX ADMIN — ONDANSETRON 4 MG: 2 INJECTION INTRAMUSCULAR; INTRAVENOUS at 11:01

## 2023-01-09 RX ADMIN — HYDROCODONE BITARTRATE AND ACETAMINOPHEN 1 TABLET: 10; 325 TABLET ORAL at 01:01

## 2023-01-09 RX ADMIN — HYDROMORPHONE HYDROCHLORIDE 0.2 MG: 1 INJECTION, SOLUTION INTRAMUSCULAR; INTRAVENOUS; SUBCUTANEOUS at 11:01

## 2023-01-09 RX ADMIN — SODIUM CHLORIDE: 0.9 INJECTION, SOLUTION INTRAVENOUS at 10:01

## 2023-01-09 RX ADMIN — MIDAZOLAM 2 MG: 1 INJECTION INTRAMUSCULAR; INTRAVENOUS at 08:01

## 2023-01-09 RX ADMIN — PROPOFOL 150 MG: 10 INJECTION, EMULSION INTRAVENOUS at 09:01

## 2023-01-09 RX ADMIN — PROPOFOL 60 MG: 10 INJECTION, EMULSION INTRAVENOUS at 09:01

## 2023-01-09 RX ADMIN — CEPHALEXIN 500 MG: 500 CAPSULE ORAL at 05:01

## 2023-01-09 RX ADMIN — SUCCINYLCHOLINE CHLORIDE 100 MG: 20 INJECTION, SOLUTION INTRAMUSCULAR; INTRAVENOUS; PARENTERAL at 09:01

## 2023-01-09 RX ADMIN — Medication 200 MCG: at 10:01

## 2023-01-09 RX ADMIN — CEPHALEXIN 500 MG: 500 CAPSULE ORAL at 01:01

## 2023-01-09 RX ADMIN — FENTANYL CITRATE 100 MCG: 50 INJECTION INTRAMUSCULAR; INTRAVENOUS at 09:01

## 2023-01-09 RX ADMIN — Medication 200 MCG: at 09:01

## 2023-01-09 RX ADMIN — CEFAZOLIN 2 G: 2 INJECTION, POWDER, FOR SOLUTION INTRAMUSCULAR; INTRAVENOUS at 09:01

## 2023-01-09 RX ADMIN — HYDROCODONE BITARTRATE AND ACETAMINOPHEN 1 TABLET: 10; 325 TABLET ORAL at 07:01

## 2023-01-09 RX ADMIN — PHENYLEPHRINE HYDROCHLORIDE 0.5 MCG/KG/MIN: 10 INJECTION INTRAVENOUS at 10:01

## 2023-01-09 RX ADMIN — SODIUM CHLORIDE, SODIUM GLUCONATE, SODIUM ACETATE, POTASSIUM CHLORIDE, MAGNESIUM CHLORIDE, SODIUM PHOSPHATE, DIBASIC, AND POTASSIUM PHOSPHATE: .53; .5; .37; .037; .03; .012; .00082 INJECTION, SOLUTION INTRAVENOUS at 09:01

## 2023-01-09 RX ADMIN — Medication 10 MG: at 10:01

## 2023-01-09 NOTE — TRANSFER OF CARE
"Anesthesia Transfer of Care Note    Patient: Jina Castro    Procedure(s) Performed: Procedure(s) (LRB):  THYROIDECTOMY-NIM tube (Left)    Patient location: PACU    Anesthesia Type: general    Transport from OR: Transported from OR on 6-10 L/min O2 by face mask with adequate spontaneous ventilation    Post pain: adequate analgesia    Post assessment: no apparent anesthetic complications    Post vital signs: stable    Level of consciousness: awake and alert    Nausea/Vomiting: no nausea/vomiting    Complications: none    Transfer of care protocol was followed      Last vitals:   Visit Vitals  /80 (BP Location: Right arm, Patient Position: Lying)   Pulse 73   Temp 36.2 °C (97.2 °F) (Tympanic)   Resp 12   Ht 5' 2" (1.575 m)   Wt 74.4 kg (164 lb)   LMP  (LMP Unknown)   SpO2 100%   Breastfeeding No   BMI 30.00 kg/m²     "

## 2023-01-09 NOTE — NURSING TRANSFER
Nursing Transfer Note      1/9/2023     Reason patient is being transferred: per order    Transfer To: 1042    Transfer via bed    Transported by escort    Medicines sent: N/A    Any special needs or follow-up needed: N/A    Chart send with patient: Yes    Notified: mother    Patient reassessed at: 1/9/2023 @ 1434

## 2023-01-09 NOTE — NURSING
Pt transferred to unit via bed with assistance from tech. Vitals maintained, sx site dry, intact, and clean, no pain noted at moment. Bed in lowest position, side rails up x 3, call bell in reach, will continue pt care for remainder of shift.

## 2023-01-09 NOTE — BRIEF OP NOTE
Luca Santoyo - Surgery ([facility])  Brief Operative Note    SUMMARY     Surgery Date: 1/9/2023     Surgeon(s) and Role:     * Jose M Jarvis MD - Primary     * Rosales Whaley MD - Resident - Assisting        Pre-op Diagnosis:  Multinodular goiter [E04.2]    Post-op Diagnosis:  Post-Op Diagnosis Codes:     * Multinodular goiter [E04.2]    Procedure(s) (LRB):  THYROIDECTOMY-NIM tube (Left)    Anesthesia: General    Operative Findings: large multinodular left thyroid lobe    Estimated Blood Loss: * No values recorded between 1/9/2023  9:40 AM and 1/9/2023 11:23 AM *    Estimated Blood Loss has been documented.         Specimens:   Specimen (24h ago, onward)       Start     Ordered    01/09/23 1049  Specimen to Pathology, Surgery ENT  Once        Comments: Pre-op Diagnosis: Multinodular goiter [E04.2]Procedure(s):THYROIDECTOMY-NIM tube Number of specimens: 1Name of specimens: 1. Left Thyroid Lobe - permanent     References:    Click here for ordering Quick Tip   Question Answer Comment   Procedure Type: ENT    Specimen Class: Routine/Screening    Which provider would you like to cc? JOSE M JARVIS    Release to patient Immediate        01/09/23 1049                    [id number]

## 2023-01-09 NOTE — OP NOTE
Surgery Date: 1/9/2023      Surgeon(s) and Role:     * Nelli Jarvis MD - Primary     * Rosales Whaley MD - Resident - Assisting           Pre-op Diagnosis:  Multinodular goiter [E04.2]     Post-op Diagnosis:  Post-Op Diagnosis Codes:     * Multinodular goiter [E04.2]     Procedure(s) (LRB):  THYROIDECTOMY-NIM tube (Left)     Anesthesia: General     Operative Findings: large multinodular left thyroid lobe    Indication for surgery: Ms. Castro is a 47 year old female with longstanding history of MNG with associated compressive symptoms. Today she wishes to proceed with left hemithyroidectomy with right nodule surveillance. All questions were answered and she wishes to proceed with surgery.    Description of Surgery: The area was prepped and draped in the standard sterile fashion The skin was incised with a #15 blade and carried down to a subplatysmal plane. Superior and inferior subplatysmal flaps were raised. The median Raphe was divided and the strap muscled retracted laterally. Dissection proceeded laterally to the left middle thyroid vein which was divided with hemoclips. Dissection proceeded superiorly where the superior parathyroid gland was identified and preserved. The superior vascular pedicle was divided and ligated with 2-0 silk suture, taking care not to injure the superior laryngeal nerve. Dissection proceeded inferiorly where the recurrent laryngeal nerve was identified and traced to its insertion into the cricothyroid joint. The inferior parathyroid was identified and preserved. The inferior vascular pedicle was divided and ligated. The ligament of Roy and the remaining pretracheal attachments were removed with bipolar electrocautery. The junction of the contralateral lobe and isthmus was cross-clamped and ligated with the Harmonic scalpel. The specimen was sent for permanent histopathology. Post-resection stimulation revealed good waveforms in both recurrent nerves. Fibrillar was placed into  the wound bed after checking a Valsalva to 30cm H2O and confirming no active bleeding. A 10 Fr round hardik drain was brought out through a separate skin incision and secured with a 3-0 nylon suture. The wound was then closed in layers. The platysma was reapproximated with 3-0 vicryl sutures. The deep dermal layer was reapproximated with 4-0 monocryl sutures and the skin edges apposed with dermabond.      Estimated Blood Loss: 30 cc    Complications: None         Specimens: Left Thyroid Lobe

## 2023-01-09 NOTE — ANESTHESIA PROCEDURE NOTES
Intubation    Date/Time: 1/9/2023 9:08 AM  Performed by: Sherine Betancourt MD  Authorized by: Conrado Oconnor III, MD     Intubation:     Induction:  Intravenous    Intubated:  Postinduction    Mask Ventilation:  Not attempted    Attempts:  1    Attempted By:  Resident anesthesiologist    Method of Intubation:  Video laryngoscopy    Blade:  Sims 3    Laryngeal View Grade: Grade I - full view of cords      Difficult Airway Encountered?: No      Complications:  None    Airway Device:  EMG ETT (NIMS)    Airway Device Size:  8.0    Style/Cuff Inflation:  Cuffed    Inflation Amount (mL):  8    Tube secured:  20    Secured at:  The lips    Placement Verified By:  Capnometry    Complicating Factors:  None    Findings Post-Intubation:  BS equal bilateral and atraumatic/condition of teeth unchanged

## 2023-01-09 NOTE — INTERVAL H&P NOTE
The patient has been examined and the H&P has been reviewed:    I concur with the findings and changes have been noted since the H&P was written: Patient has decided to proceed with a left thyroid lobectomy only (the larger side). She understands that her most recent biopsy of a right thyroid nodule resulted as AUS , which carries a 5-15% risk of malignancy.    Surgery risks, benefits and alternative options discussed and understood by patient/family.          There are no hospital problems to display for this patient.

## 2023-01-09 NOTE — ANESTHESIA POSTPROCEDURE EVALUATION
Anesthesia Post Evaluation    Patient: Jina Castro    Procedure(s) Performed: Procedure(s) (LRB):  THYROIDECTOMY-NIM tube (Left)    Final Anesthesia Type: general      Patient location during evaluation: PACU  Patient participation: Yes- Able to Participate  Level of consciousness: awake and alert  Post-procedure vital signs: reviewed and stable  Pain management: adequate  Airway patency: patent    PONV status at discharge: No PONV  Anesthetic complications: no      Cardiovascular status: blood pressure returned to baseline  Respiratory status: unassisted  Hydration status: euvolemic  Follow-up not needed.          Vitals Value Taken Time   /75 01/09/23 1431   Temp 36.2 °C (97.2 °F) 01/09/23 1129   Pulse 73 01/09/23 1441   Resp 13 01/09/23 1441   SpO2 97 % 01/09/23 1441   Vitals shown include unvalidated device data.      No case tracking events are documented in the log.      Pain/Adeel Score: Pain Rating Prior to Med Admin: 7 (1/9/2023  1:05 PM)  Adeel Score: 10 (1/9/2023  1:05 PM)

## 2023-01-10 VITALS
HEIGHT: 62 IN | RESPIRATION RATE: 18 BRPM | WEIGHT: 165.56 LBS | TEMPERATURE: 98 F | DIASTOLIC BLOOD PRESSURE: 60 MMHG | BODY MASS INDEX: 30.47 KG/M2 | SYSTOLIC BLOOD PRESSURE: 104 MMHG | HEART RATE: 80 BPM | OXYGEN SATURATION: 97 %

## 2023-01-10 PROCEDURE — 25000003 PHARM REV CODE 250: Performed by: STUDENT IN AN ORGANIZED HEALTH CARE EDUCATION/TRAINING PROGRAM

## 2023-01-10 RX ORDER — HYDROCODONE BITARTRATE AND ACETAMINOPHEN 5; 325 MG/1; MG/1
1 TABLET ORAL EVERY 6 HOURS PRN
Qty: 20 TABLET | Refills: 0 | Status: SHIPPED | OUTPATIENT
Start: 2023-01-10

## 2023-01-10 RX ORDER — CEPHALEXIN 500 MG/1
500 CAPSULE ORAL EVERY 12 HOURS
Qty: 10 CAPSULE | Refills: 0 | Status: SHIPPED | OUTPATIENT
Start: 2023-01-10 | End: 2023-01-15

## 2023-01-10 RX ORDER — ONDANSETRON 4 MG/1
4 TABLET, ORALLY DISINTEGRATING ORAL EVERY 6 HOURS PRN
Qty: 15 TABLET | Refills: 0 | Status: SHIPPED | OUTPATIENT
Start: 2023-01-10

## 2023-01-10 RX ADMIN — CEPHALEXIN 500 MG: 500 CAPSULE ORAL at 11:01

## 2023-01-10 RX ADMIN — CEPHALEXIN 500 MG: 500 CAPSULE ORAL at 05:01

## 2023-01-10 RX ADMIN — CEPHALEXIN 500 MG: 500 CAPSULE ORAL at 12:01

## 2023-01-10 NOTE — PLAN OF CARE
Pt rested well overnight. Pain controlled with current regimen, only required PRN pain med x1. Pt ambulating and voiding without difficulty. GABRIELLE drain intact minimal output noted. Safety and comfort maintained no acute distress noted. Pt anticipating DC today.

## 2023-01-10 NOTE — HOSPITAL COURSE
46 y/o F with MNG now s/p left thyroid lobectomy on 1/9/23. Admitted for observation. Did well on the floor without issue. Stable and ready for discharge with drain, will follow up for removal.    NAD  Neck incision with dermabond dressing, c/d/I, no erythema or drainage  GABRIELLE drain with SS output  Normal WOB  Strong voice

## 2023-01-10 NOTE — DISCHARGE SUMMARY
[facility]  Otorhinolaryngology-Head & Neck Surgery  Discharge Summary      Patient Name: Jina Castro  MRN: [id number]  Admission Date: 1/9/2023  Hospital Length of Stay: 0 days  Discharge Date and Time:  01/10/2023 7:11 AM  Attending Physician: Nelli Jarvis MD   Discharging Provider: Rosales Whaley MD  Primary Care Provider: Primary Doctor No    HPI:   No notes on file    Procedure(s) (LRB):  THYROIDECTOMY-NIM tube (Left)      Indwelling Lines/Drains at time of discharge:   Lines/Drains/Airways     Drain  Duration                Closed/Suction Drain 01/09/23 1048 Left Neck Bulb 10 Fr. <1 day              Hospital Course: 48 y/o F with MNG now s/p left thyroid lobectomy on 1/9/23. Admitted for observation. Did well on the floor without issue. Stable and ready for discharge with drain, will follow up for removal.    NAD  Neck incision with dermabond dressing, c/d/I, no erythema or drainage  GABRIELLE drain with SS output  Normal WOB  Strong voice      Goals of Care Treatment Preferences:  Code Status: Full Code      Consults:     Significant Diagnostic Studies: none    Pending Diagnostic Studies:     Procedure Component Value Units Date/Time    Specimen to Pathology, Surgery ENT [[id number]] Collected: 01/09/23 1049    Order Status: Sent Lab Status: In process Updated: 01/09/23 1527    Specimen: Tissue         Final Active Diagnoses:    Diagnosis Date Noted POA    PRINCIPAL PROBLEM:  Multinodular goiter [E04.2] 01/09/2023 Yes      Problems Resolved During this Admission:      Discharged Condition: good    Disposition: Home or Self Care    Follow Up:   Follow-up Information     Vane Kebede NP Follow up on 1/12/2023.    Specialty: Otolaryngology  Contact information:  [address]  [facility] [address]  [phone]                       Patient Instructions:      Diet Adult Regular     Other restrictions (specify):   Order Comments: No heavy lifting (nothing more than 10 lbs) for 2 weeks. No  strenuous activity for 2 weeks.     Notify your health care provider if you experience any of the following:  persistent nausea and vomiting or diarrhea     Notify your health care provider if you experience any of the following:  severe uncontrolled pain     Notify your health care provider if you experience any of the following:  redness, tenderness, or signs of infection (pain, swelling, redness, odor or green/yellow discharge around incision site)     Notify your health care provider if you experience any of the following:  difficulty breathing or increased cough     No dressing needed   Order Comments: Nurse to teach how to take care of drain. Will follow up for removal in a couple days if output is less than 30 ml. Do not let water run over drain. Once drain is out, ok to let water run over incision the following day. Do not scrub incision. Will peel off on its own over time. Do not submerge in water (no pools, baths).     Medications:  Reconciled Home Medications:      Medication List      START taking these medications    cephALEXin 500 MG capsule  Commonly known as: KEFLEX  Take 1 capsule (500 mg total) by mouth every 12 (twelve) hours. for 5 days     HYDROcodone-acetaminophen 5-325 mg per tablet  Commonly known as: NORCO  Take 1 tablet by mouth every 6 (six) hours as needed.     ondansetron 4 MG Tbdl  Commonly known as: ZOFRAN-ODT  Take 1 tablet (4 mg total) by mouth every 6 (six) hours as needed (nausea).          Time spent on the discharge of patient: 10 minutes    Rosales Whaley MD  Otorhinolaryngology-Head & Neck Surgery  [facility]

## 2023-01-10 NOTE — PLAN OF CARE
[facility]  Discharge Final Note    Primary Care Provider: Primary Doctor No    Expected Discharge Date: 1/10/2023    Final Discharge Note (most recent)       Final Note - 01/10/23 1003          Final Note    Assessment Type Final Discharge Note     Anticipated Discharge Disposition Home or Self Care     Hospital Resources/Appts/Education Provided Provided patient/caregiver with written discharge plan information        Post-Acute Status    Discharge Delays None known at this time                     Important Message from Medicare             Contact Info       Vane Kebede NP   Specialty: Otolaryngology    [address]  [facility] [address]   Phone: [phone]       Next Steps: Follow up on 1/12/2023          Pt to d/c home with family. No d/c needs reported by medical team at this time.     Leonora Velasco LCSW  Case Management/[facility]  [phone]

## 2023-01-10 NOTE — NURSING
Printed and reviewed AVS/discharge instructions with patient. Pt verbalizes understanding of follow up appt Thursday January 12, Pt educated on GABRIELLE drain care and pt demonstrates how to empty and record output. Pt's printed prescriptions handed to pt. Pt verbalizes understanding of medication adherence. Pt's pain controlled and vitals wnl. Pt request to eat lunch before being wheeled down.

## 2023-01-12 ENCOUNTER — OFFICE VISIT (OUTPATIENT)
Dept: OTOLARYNGOLOGY | Facility: CLINIC | Age: 48
End: 2023-01-12
Payer: COMMERCIAL

## 2023-01-12 VITALS
WEIGHT: 166 LBS | BODY MASS INDEX: 30.36 KG/M2 | HEART RATE: 76 BPM | SYSTOLIC BLOOD PRESSURE: 107 MMHG | TEMPERATURE: 98 F | DIASTOLIC BLOOD PRESSURE: 75 MMHG

## 2023-01-12 DIAGNOSIS — E04.2 MULTINODULAR GOITER: Primary | ICD-10-CM

## 2023-01-12 PROCEDURE — 3078F DIAST BP <80 MM HG: CPT | Mod: CPTII,S$GLB,, | Performed by: NURSE PRACTITIONER

## 2023-01-12 PROCEDURE — 3008F PR BODY MASS INDEX (BMI) DOCUMENTED: ICD-10-PCS | Mod: CPTII,S$GLB,, | Performed by: NURSE PRACTITIONER

## 2023-01-12 PROCEDURE — 99024 POSTOP FOLLOW-UP VISIT: CPT | Mod: S$GLB,,, | Performed by: NURSE PRACTITIONER

## 2023-01-12 PROCEDURE — 1159F PR MEDICATION LIST DOCUMENTED IN MEDICAL RECORD: ICD-10-PCS | Mod: CPTII,S$GLB,, | Performed by: NURSE PRACTITIONER

## 2023-01-12 PROCEDURE — 3074F SYST BP LT 130 MM HG: CPT | Mod: CPTII,S$GLB,, | Performed by: NURSE PRACTITIONER

## 2023-01-12 PROCEDURE — 3008F BODY MASS INDEX DOCD: CPT | Mod: CPTII,S$GLB,, | Performed by: NURSE PRACTITIONER

## 2023-01-12 PROCEDURE — 99024 PR POST-OP FOLLOW-UP VISIT: ICD-10-PCS | Mod: S$GLB,,, | Performed by: NURSE PRACTITIONER

## 2023-01-12 PROCEDURE — 3074F PR MOST RECENT SYSTOLIC BLOOD PRESSURE < 130 MM HG: ICD-10-PCS | Mod: CPTII,S$GLB,, | Performed by: NURSE PRACTITIONER

## 2023-01-12 PROCEDURE — 99999 PR PBB SHADOW E&M-EST. PATIENT-LVL III: CPT | Mod: PBBFAC,,, | Performed by: NURSE PRACTITIONER

## 2023-01-12 PROCEDURE — 3078F PR MOST RECENT DIASTOLIC BLOOD PRESSURE < 80 MM HG: ICD-10-PCS | Mod: CPTII,S$GLB,, | Performed by: NURSE PRACTITIONER

## 2023-01-12 PROCEDURE — 99999 PR PBB SHADOW E&M-EST. PATIENT-LVL III: ICD-10-PCS | Mod: PBBFAC,,, | Performed by: NURSE PRACTITIONER

## 2023-01-12 PROCEDURE — 1159F MED LIST DOCD IN RCRD: CPT | Mod: CPTII,S$GLB,, | Performed by: NURSE PRACTITIONER

## 2023-01-12 NOTE — PROGRESS NOTES
Subjective:       Patient ID: Jina Castro is a 47 y.o. female.    Chief Complaint: No chief complaint on file.    HPI    Jina Castro returns for a post op visit. She has been doing well since surgery. Pain is well controlled. No complaints    No past medical history on file.    Past Surgical History:   Procedure Laterality Date    THYROIDECTOMY Left 1/9/2023    Procedure: THYROIDECTOMY-NIM tube;  Surgeon: Nelli Jarvis MD;  Location: [address];  Service: ENT;  Laterality: Left;         Current Outpatient Medications:     cephALEXin (KEFLEX) 500 MG capsule, Take 1 capsule (500 mg total) by mouth every 12 (twelve) hours. for 5 days, Disp: 10 capsule, Rfl: 0    HYDROcodone-acetaminophen (NORCO) 5-325 mg per tablet, Take 1 tablet by mouth every 6 (six) hours as needed., Disp: 20 tablet, Rfl: 0    ondansetron (ZOFRAN-ODT) 4 MG TbDL, Take 1 tablet (4 mg total) by mouth every 6 (six) hours as needed (nausea)., Disp: 15 tablet, Rfl: 0    Review of patient's allergies indicates:  No Known Allergies    Social History     Socioeconomic History    Marital status: Single   Tobacco Use    Smoking status: Never    Smokeless tobacco: Never   Substance and Sexual Activity    Alcohol use: Yes     Comment: socially    Drug use: No       Family History   Problem Relation Age of Onset    Melanoma Neg Hx     Psoriasis Neg Hx     Lupus Neg Hx          Review of Systems   Constitutional: Negative.    HENT: Negative.     Eyes: Negative.    Respiratory: Negative.     Cardiovascular: Negative.    Gastrointestinal: Negative.    Endocrine: Negative.    Genitourinary: Negative.    Musculoskeletal: Negative.    Integumentary:  Negative.   Allergic/Immunologic: Negative.    Neurological: Negative.    Hematological: Negative.    Psychiatric/Behavioral: Negative.         Objective:      Physical Exam  Constitutional:       Appearance: Normal appearance.   HENT:      Head: Normocephalic and atraumatic.      Right Ear:  External ear normal.      Left Ear: External ear normal.   Neck:      Comments: Incision CDI.  No redness, drainage, or fluid collection noted.  Edges well approximated.  Drain with small amount of serosanguinous drainage, removed.  Pulmonary:      Effort: Pulmonary effort is normal. No respiratory distress.   Neurological:      General: No focal deficit present.      Mental Status: She is alert.   Psychiatric:         Mood and Affect: Mood normal.         Behavior: Behavior normal.         Thought Content: Thought content normal.       Assessment/Plan       Problem List Items Addressed This Visit          Endocrine    Multinodular goiter - Primary     Doing well. Drain removed. Drain removed. Path pending. RTC in 3 weeks, sooner if needed.

## 2023-01-19 LAB
FINAL PATHOLOGIC DIAGNOSIS: NORMAL
Lab: NORMAL

## 2023-02-01 ENCOUNTER — OFFICE VISIT (OUTPATIENT)
Dept: OTOLARYNGOLOGY | Facility: CLINIC | Age: 48
End: 2023-02-01
Payer: COMMERCIAL

## 2023-02-01 VITALS
HEIGHT: 62 IN | WEIGHT: 165.38 LBS | SYSTOLIC BLOOD PRESSURE: 104 MMHG | DIASTOLIC BLOOD PRESSURE: 72 MMHG | BODY MASS INDEX: 30.44 KG/M2 | HEART RATE: 92 BPM

## 2023-02-01 DIAGNOSIS — E04.2 MULTINODULAR GOITER: Primary | ICD-10-CM

## 2023-02-01 PROCEDURE — 3078F PR MOST RECENT DIASTOLIC BLOOD PRESSURE < 80 MM HG: ICD-10-PCS | Mod: CPTII,S$GLB,, | Performed by: NURSE PRACTITIONER

## 2023-02-01 PROCEDURE — 3078F DIAST BP <80 MM HG: CPT | Mod: CPTII,S$GLB,, | Performed by: NURSE PRACTITIONER

## 2023-02-01 PROCEDURE — 1159F PR MEDICATION LIST DOCUMENTED IN MEDICAL RECORD: ICD-10-PCS | Mod: CPTII,S$GLB,, | Performed by: NURSE PRACTITIONER

## 2023-02-01 PROCEDURE — 3074F PR MOST RECENT SYSTOLIC BLOOD PRESSURE < 130 MM HG: ICD-10-PCS | Mod: CPTII,S$GLB,, | Performed by: NURSE PRACTITIONER

## 2023-02-01 PROCEDURE — 99999 PR PBB SHADOW E&M-EST. PATIENT-LVL III: CPT | Mod: PBBFAC,,, | Performed by: NURSE PRACTITIONER

## 2023-02-01 PROCEDURE — 99999 PR PBB SHADOW E&M-EST. PATIENT-LVL III: ICD-10-PCS | Mod: PBBFAC,,, | Performed by: NURSE PRACTITIONER

## 2023-02-01 PROCEDURE — 3074F SYST BP LT 130 MM HG: CPT | Mod: CPTII,S$GLB,, | Performed by: NURSE PRACTITIONER

## 2023-02-01 PROCEDURE — 99024 POSTOP FOLLOW-UP VISIT: CPT | Mod: S$GLB,,, | Performed by: NURSE PRACTITIONER

## 2023-02-01 PROCEDURE — 3008F PR BODY MASS INDEX (BMI) DOCUMENTED: ICD-10-PCS | Mod: CPTII,S$GLB,, | Performed by: NURSE PRACTITIONER

## 2023-02-01 PROCEDURE — 3008F BODY MASS INDEX DOCD: CPT | Mod: CPTII,S$GLB,, | Performed by: NURSE PRACTITIONER

## 2023-02-01 PROCEDURE — 99024 PR POST-OP FOLLOW-UP VISIT: ICD-10-PCS | Mod: S$GLB,,, | Performed by: NURSE PRACTITIONER

## 2023-02-01 PROCEDURE — 1159F MED LIST DOCD IN RCRD: CPT | Mod: CPTII,S$GLB,, | Performed by: NURSE PRACTITIONER

## 2023-02-01 NOTE — ASSESSMENT & PLAN NOTE
Cold Symptoms, Ambulatory Care   GENERAL INFORMATION:   Cold symptoms  include sneezing, dry throat, a stuffy nose, headache, watery eyes, and a cough  Your cough may be dry, or you may cough up mucus  You may also have muscle aches, joint pain, and tiredness  Rarely, you may have a fever  Cold symptoms occur from inflammation in your upper respiratory system caused by a virus  Most colds go away without treatment  Seek immediate care for the following symptoms:   · A heartbeat that is much faster than usual for you     · A swollen neck that is sore to the touch     · Increased tiredness and weakness    · Pinpoint or larger reddish-purple dots on your skin     · Poor or no appetite  Treatment for cold symptoms  may include NSAIDS to decrease muscle aches and fever  Do not give NSAID medicines to children under 10months of age without direction from your child's doctor  Cold medicines may also be given to decrease coughing, nasal stuffiness, sneezing, and a runny nose  Do not give cold medicines to children under 11years of age without direction from your child's doctor  Manage your cold symptoms with the following:   · Drink liquids  to help thin and loosen thick mucus so you can cough it up  Liquids will also keep you hydrated  Ask your healthcare provider which liquids are best for you and how much to drink each day  · Do not smoke  because it may worsen your symptoms and increase the length of time you feel sick  Talk with your healthcare provider if you need help to stop smoking  Prevent the spread of germs  by washing your hands often  You can spread your cold germs to others for at least 3 days after your symptoms start  Do not share items, such as eating utensils  Cover your nose and mouth when you cough or sneeze using the crook of your elbow instead of your hands  Throw used tissues in the garbage    Follow up with your healthcare provider as directed:  Write down your questions so you remember to ask Healing well. Will check TSH/T4 in 3 weeks. Questions answered. Dr. Jarvis to arrange genetic testing on thyroid tissue.   them during your visits  CARE AGREEMENT:   You have the right to help plan your care  Learn about your health condition and how it may be treated  Discuss treatment options with your caregivers to decide what care you want to receive  You always have the right to refuse treatment  The above information is an  only  It is not intended as medical advice for individual conditions or treatments  Talk to your doctor, nurse or pharmacist before following any medical regimen to see if it is safe and effective for you  © 2014 [address] is for End User's use only and may not be sold, redistributed or otherwise used for commercial purposes  All illustrations and images included in CareNotes® are the copyrighted property of [employer]  or [address]

## 2023-02-01 NOTE — PROGRESS NOTES
Subjective:       Patient ID: Jina Castro is a 47 y.o. female.    Chief Complaint: Post-op Evaluation    HPI    Jina Castro returns for a post op visit. She has been doing well since surgery. She is fatigued.    No past medical history on file.    Past Surgical History:   Procedure Laterality Date    THYROIDECTOMY Left 1/9/2023    Procedure: THYROIDECTOMY-NIM tube;  Surgeon: Nelli Jarvis MD;  Location: [facility] OR [address] [place] [address];  Service: ENT;  Laterality: Left;         Current Outpatient Medications:     HYDROcodone-acetaminophen (NORCO) 5-325 mg per tablet, Take 1 tablet by mouth every 6 (six) hours as needed., Disp: 20 tablet, Rfl: 0    ondansetron (ZOFRAN-ODT) 4 MG TbDL, Take 1 tablet (4 mg total) by mouth every 6 (six) hours as needed (nausea)., Disp: 15 tablet, Rfl: 0    Review of patient's allergies indicates:  No Known Allergies    Social History     Socioeconomic History    Marital status: Single   Tobacco Use    Smoking status: Never    Smokeless tobacco: Never   Substance and Sexual Activity    Alcohol use: Yes     Comment: socially    Drug use: No       Family History   Problem Relation Age of Onset    Melanoma Neg Hx     Psoriasis Neg Hx     Lupus Neg Hx          Review of Systems   Constitutional: Negative.    HENT: Negative.     Eyes: Negative.    Respiratory: Negative.     Cardiovascular: Negative.    Gastrointestinal: Negative.    Endocrine: Negative.    Genitourinary: Negative.    Musculoskeletal: Negative.    Integumentary:  Negative.   Allergic/Immunologic: Negative.    Neurological: Negative.    Hematological: Negative.    Psychiatric/Behavioral: Negative.         Objective:      Physical Exam  Constitutional:       Appearance: Normal appearance.   HENT:      Head: Normocephalic and atraumatic.      Right Ear: External ear normal.      Left Ear: External ear normal.   Neck:      Comments: Incision CDI.  No redness, drainage, or fluid collection noted.  Edges well  approximated.    Pulmonary:      Effort: Pulmonary effort is normal. No respiratory distress.   Neurological:      General: No focal deficit present.      Mental Status: She is alert.   Psychiatric:         Mood and Affect: Mood normal.         Behavior: Behavior normal.         Thought Content: Thought content normal.       Assessment/Plan       Problem List Items Addressed This Visit          Endocrine    Multinodular goiter - Primary     Healing well. Will check TSH/T4 in 3 weeks. Questions answered. Dr. Jarvis to arrange genetic testing on thyroid tissue.         Relevant Orders    TSH    T4, Free

## 2023-02-09 ENCOUNTER — PATIENT MESSAGE (OUTPATIENT)
Dept: OTOLARYNGOLOGY | Facility: CLINIC | Age: 48
End: 2023-02-09
Payer: COMMERCIAL

## 2023-02-15 ENCOUNTER — PATIENT MESSAGE (OUTPATIENT)
Dept: OTOLARYNGOLOGY | Facility: CLINIC | Age: 48
End: 2023-02-15
Payer: COMMERCIAL

## 2023-02-24 ENCOUNTER — LAB VISIT (OUTPATIENT)
Dept: LAB | Facility: HOSPITAL | Age: 48
End: 2023-02-24
Payer: COMMERCIAL

## 2023-02-24 DIAGNOSIS — E04.2 MULTINODULAR GOITER: ICD-10-CM

## 2023-02-24 LAB
T4 FREE SERPL-MCNC: 0.82 NG/DL (ref 0.71–1.51)
TSH SERPL DL<=0.005 MIU/L-ACNC: 0.48 UIU/ML (ref 0.4–4)

## 2023-02-24 PROCEDURE — 84443 ASSAY THYROID STIM HORMONE: CPT | Performed by: NURSE PRACTITIONER

## 2023-02-24 PROCEDURE — 84439 ASSAY OF FREE THYROXINE: CPT | Performed by: NURSE PRACTITIONER

## 2023-02-24 PROCEDURE — 36415 COLL VENOUS BLD VENIPUNCTURE: CPT | Performed by: NURSE PRACTITIONER

## 2023-02-27 ENCOUNTER — PATIENT MESSAGE (OUTPATIENT)
Dept: OTOLARYNGOLOGY | Facility: CLINIC | Age: 48
End: 2023-02-27
Payer: COMMERCIAL

## 2023-03-01 ENCOUNTER — PATIENT MESSAGE (OUTPATIENT)
Dept: OTOLARYNGOLOGY | Facility: CLINIC | Age: 48
End: 2023-03-01
Payer: COMMERCIAL

## 2023-03-02 DIAGNOSIS — E04.2 MULTINODULAR GOITER: Primary | ICD-10-CM

## 2023-03-06 ENCOUNTER — PATIENT MESSAGE (OUTPATIENT)
Dept: OTOLARYNGOLOGY | Facility: CLINIC | Age: 48
End: 2023-03-06
Payer: COMMERCIAL

## 2023-03-06 ENCOUNTER — LAB VISIT (OUTPATIENT)
Dept: LAB | Facility: HOSPITAL | Age: 48
End: 2023-03-06
Payer: COMMERCIAL

## 2023-03-06 DIAGNOSIS — E04.2 MULTINODULAR GOITER: ICD-10-CM

## 2023-03-06 LAB
CA-I BLDV-SCNC: 1.26 MMOL/L (ref 1.06–1.42)
PTH-INTACT SERPL-MCNC: 65.5 PG/ML (ref 9–77)

## 2023-03-06 PROCEDURE — 82330 ASSAY OF CALCIUM: CPT | Performed by: NURSE PRACTITIONER

## 2023-03-06 PROCEDURE — 83970 ASSAY OF PARATHORMONE: CPT | Performed by: NURSE PRACTITIONER

## 2023-03-06 PROCEDURE — 36415 COLL VENOUS BLD VENIPUNCTURE: CPT | Performed by: NURSE PRACTITIONER

## 2023-03-07 ENCOUNTER — PATIENT MESSAGE (OUTPATIENT)
Dept: OTOLARYNGOLOGY | Facility: CLINIC | Age: 48
End: 2023-03-07
Payer: COMMERCIAL

## 2023-03-14 ENCOUNTER — OFFICE VISIT (OUTPATIENT)
Dept: PRIMARY CARE CLINIC | Facility: CLINIC | Age: 48
End: 2023-03-14
Payer: COMMERCIAL

## 2023-03-14 VITALS
HEIGHT: 62 IN | SYSTOLIC BLOOD PRESSURE: 122 MMHG | WEIGHT: 163.13 LBS | BODY MASS INDEX: 30.02 KG/M2 | DIASTOLIC BLOOD PRESSURE: 80 MMHG | HEART RATE: 79 BPM | OXYGEN SATURATION: 99 % | TEMPERATURE: 98 F

## 2023-03-14 DIAGNOSIS — E89.0 STATUS POST THYROIDECTOMY: ICD-10-CM

## 2023-03-14 DIAGNOSIS — R49.0 HOARSENESS: Primary | ICD-10-CM

## 2023-03-14 DIAGNOSIS — R53.83 FATIGUE, UNSPECIFIED TYPE: ICD-10-CM

## 2023-03-14 PROCEDURE — 99213 OFFICE O/P EST LOW 20 MIN: CPT | Mod: S$GLB,,, | Performed by: FAMILY MEDICINE

## 2023-03-14 PROCEDURE — 1159F MED LIST DOCD IN RCRD: CPT | Mod: CPTII,S$GLB,, | Performed by: FAMILY MEDICINE

## 2023-03-14 PROCEDURE — 3008F BODY MASS INDEX DOCD: CPT | Mod: CPTII,S$GLB,, | Performed by: FAMILY MEDICINE

## 2023-03-14 PROCEDURE — 99999 PR PBB SHADOW E&M-EST. PATIENT-LVL III: ICD-10-PCS | Mod: PBBFAC,,, | Performed by: FAMILY MEDICINE

## 2023-03-14 PROCEDURE — 3008F PR BODY MASS INDEX (BMI) DOCUMENTED: ICD-10-PCS | Mod: CPTII,S$GLB,, | Performed by: FAMILY MEDICINE

## 2023-03-14 PROCEDURE — 1159F PR MEDICATION LIST DOCUMENTED IN MEDICAL RECORD: ICD-10-PCS | Mod: CPTII,S$GLB,, | Performed by: FAMILY MEDICINE

## 2023-03-14 PROCEDURE — 99213 PR OFFICE/OUTPT VISIT, EST, LEVL III, 20-29 MIN: ICD-10-PCS | Mod: S$GLB,,, | Performed by: FAMILY MEDICINE

## 2023-03-14 PROCEDURE — 99999 PR PBB SHADOW E&M-EST. PATIENT-LVL III: CPT | Mod: PBBFAC,,, | Performed by: FAMILY MEDICINE

## 2023-03-14 PROCEDURE — 3074F PR MOST RECENT SYSTOLIC BLOOD PRESSURE < 130 MM HG: ICD-10-PCS | Mod: CPTII,S$GLB,, | Performed by: FAMILY MEDICINE

## 2023-03-14 PROCEDURE — 3079F DIAST BP 80-89 MM HG: CPT | Mod: CPTII,S$GLB,, | Performed by: FAMILY MEDICINE

## 2023-03-14 PROCEDURE — 1160F PR REVIEW ALL MEDS BY PRESCRIBER/CLIN PHARMACIST DOCUMENTED: ICD-10-PCS | Mod: CPTII,S$GLB,, | Performed by: FAMILY MEDICINE

## 2023-03-14 PROCEDURE — 1160F RVW MEDS BY RX/DR IN RCRD: CPT | Mod: CPTII,S$GLB,, | Performed by: FAMILY MEDICINE

## 2023-03-14 PROCEDURE — 3079F PR MOST RECENT DIASTOLIC BLOOD PRESSURE 80-89 MM HG: ICD-10-PCS | Mod: CPTII,S$GLB,, | Performed by: FAMILY MEDICINE

## 2023-03-14 PROCEDURE — 3074F SYST BP LT 130 MM HG: CPT | Mod: CPTII,S$GLB,, | Performed by: FAMILY MEDICINE

## 2023-03-14 RX ORDER — IBUPROFEN 800 MG/1
800 TABLET ORAL 3 TIMES DAILY
Qty: 30 TABLET | Refills: 0 | Status: SHIPPED | OUTPATIENT
Start: 2023-03-14

## 2023-03-15 ENCOUNTER — LAB VISIT (OUTPATIENT)
Dept: LAB | Facility: HOSPITAL | Age: 48
End: 2023-03-15
Attending: FAMILY MEDICINE
Payer: COMMERCIAL

## 2023-03-15 DIAGNOSIS — R53.83 FATIGUE, UNSPECIFIED TYPE: ICD-10-CM

## 2023-03-15 LAB
ANION GAP SERPL CALC-SCNC: 7 MMOL/L (ref 8–16)
BASOPHILS # BLD AUTO: 0.09 K/UL (ref 0–0.2)
BASOPHILS NFR BLD: 1 % (ref 0–1.9)
BUN SERPL-MCNC: 13 MG/DL (ref 6–20)
CALCIUM SERPL-MCNC: 9.4 MG/DL (ref 8.7–10.5)
CHLORIDE SERPL-SCNC: 106 MMOL/L (ref 95–110)
CO2 SERPL-SCNC: 29 MMOL/L (ref 23–29)
CREAT SERPL-MCNC: 0.8 MG/DL (ref 0.5–1.4)
DIFFERENTIAL METHOD: ABNORMAL
EOSINOPHIL # BLD AUTO: 0.8 K/UL (ref 0–0.5)
EOSINOPHIL NFR BLD: 8.4 % (ref 0–8)
ERYTHROCYTE [DISTWIDTH] IN BLOOD BY AUTOMATED COUNT: 13.9 % (ref 11.5–14.5)
EST. GFR  (NO RACE VARIABLE): >60 ML/MIN/1.73 M^2
GLUCOSE SERPL-MCNC: 90 MG/DL (ref 70–110)
HCT VFR BLD AUTO: 40.1 % (ref 37–48.5)
HGB BLD-MCNC: 12.6 G/DL (ref 12–16)
IMM GRANULOCYTES # BLD AUTO: 0.04 K/UL (ref 0–0.04)
IMM GRANULOCYTES NFR BLD AUTO: 0.4 % (ref 0–0.5)
LYMPHOCYTES # BLD AUTO: 2.8 K/UL (ref 1–4.8)
LYMPHOCYTES NFR BLD: 31.3 % (ref 18–48)
MCH RBC QN AUTO: 30.9 PG (ref 27–31)
MCHC RBC AUTO-ENTMCNC: 31.4 G/DL (ref 32–36)
MCV RBC AUTO: 98 FL (ref 82–98)
MONOCYTES # BLD AUTO: 0.8 K/UL (ref 0.3–1)
MONOCYTES NFR BLD: 8.5 % (ref 4–15)
NEUTROPHILS # BLD AUTO: 4.5 K/UL (ref 1.8–7.7)
NEUTROPHILS NFR BLD: 50.4 % (ref 38–73)
NRBC BLD-RTO: 0 /100 WBC
PLATELET # BLD AUTO: 346 K/UL (ref 150–450)
PMV BLD AUTO: 11.6 FL (ref 9.2–12.9)
POTASSIUM SERPL-SCNC: 4 MMOL/L (ref 3.5–5.1)
RBC # BLD AUTO: 4.08 M/UL (ref 4–5.4)
SODIUM SERPL-SCNC: 142 MMOL/L (ref 136–145)
WBC # BLD AUTO: 8.95 K/UL (ref 3.9–12.7)

## 2023-03-15 PROCEDURE — 36415 COLL VENOUS BLD VENIPUNCTURE: CPT | Mod: PN | Performed by: FAMILY MEDICINE

## 2023-03-15 PROCEDURE — 80048 BASIC METABOLIC PNL TOTAL CA: CPT | Performed by: FAMILY MEDICINE

## 2023-03-15 PROCEDURE — 85025 COMPLETE CBC W/AUTO DIFF WBC: CPT | Performed by: FAMILY MEDICINE

## 2023-03-17 ENCOUNTER — OFFICE VISIT (OUTPATIENT)
Dept: PRIMARY CARE CLINIC | Facility: CLINIC | Age: 48
End: 2023-03-17
Payer: COMMERCIAL

## 2023-03-17 ENCOUNTER — PATIENT MESSAGE (OUTPATIENT)
Dept: PRIMARY CARE CLINIC | Facility: CLINIC | Age: 48
End: 2023-03-17
Payer: COMMERCIAL

## 2023-03-17 VITALS
OXYGEN SATURATION: 100 % | HEIGHT: 62 IN | HEART RATE: 85 BPM | SYSTOLIC BLOOD PRESSURE: 125 MMHG | TEMPERATURE: 99 F | BODY MASS INDEX: 29.57 KG/M2 | DIASTOLIC BLOOD PRESSURE: 77 MMHG | WEIGHT: 160.69 LBS

## 2023-03-17 DIAGNOSIS — J04.0 LARYNGITIS, ACUTE: ICD-10-CM

## 2023-03-17 DIAGNOSIS — J30.2 SEASONAL ALLERGIES: Primary | ICD-10-CM

## 2023-03-17 PROCEDURE — 99999 PR PBB SHADOW E&M-EST. PATIENT-LVL III: CPT | Mod: PBBFAC,,, | Performed by: STUDENT IN AN ORGANIZED HEALTH CARE EDUCATION/TRAINING PROGRAM

## 2023-03-17 PROCEDURE — 3008F BODY MASS INDEX DOCD: CPT | Mod: CPTII,S$GLB,, | Performed by: STUDENT IN AN ORGANIZED HEALTH CARE EDUCATION/TRAINING PROGRAM

## 2023-03-17 PROCEDURE — 3074F SYST BP LT 130 MM HG: CPT | Mod: CPTII,S$GLB,, | Performed by: STUDENT IN AN ORGANIZED HEALTH CARE EDUCATION/TRAINING PROGRAM

## 2023-03-17 PROCEDURE — 3008F PR BODY MASS INDEX (BMI) DOCUMENTED: ICD-10-PCS | Mod: CPTII,S$GLB,, | Performed by: STUDENT IN AN ORGANIZED HEALTH CARE EDUCATION/TRAINING PROGRAM

## 2023-03-17 PROCEDURE — 3078F PR MOST RECENT DIASTOLIC BLOOD PRESSURE < 80 MM HG: ICD-10-PCS | Mod: CPTII,S$GLB,, | Performed by: STUDENT IN AN ORGANIZED HEALTH CARE EDUCATION/TRAINING PROGRAM

## 2023-03-17 PROCEDURE — 3078F DIAST BP <80 MM HG: CPT | Mod: CPTII,S$GLB,, | Performed by: STUDENT IN AN ORGANIZED HEALTH CARE EDUCATION/TRAINING PROGRAM

## 2023-03-17 PROCEDURE — 3074F PR MOST RECENT SYSTOLIC BLOOD PRESSURE < 130 MM HG: ICD-10-PCS | Mod: CPTII,S$GLB,, | Performed by: STUDENT IN AN ORGANIZED HEALTH CARE EDUCATION/TRAINING PROGRAM

## 2023-03-17 PROCEDURE — 99214 OFFICE O/P EST MOD 30 MIN: CPT | Mod: S$GLB,,, | Performed by: STUDENT IN AN ORGANIZED HEALTH CARE EDUCATION/TRAINING PROGRAM

## 2023-03-17 PROCEDURE — 99999 PR PBB SHADOW E&M-EST. PATIENT-LVL III: ICD-10-PCS | Mod: PBBFAC,,, | Performed by: STUDENT IN AN ORGANIZED HEALTH CARE EDUCATION/TRAINING PROGRAM

## 2023-03-17 PROCEDURE — 99214 PR OFFICE/OUTPT VISIT, EST, LEVL IV, 30-39 MIN: ICD-10-PCS | Mod: S$GLB,,, | Performed by: STUDENT IN AN ORGANIZED HEALTH CARE EDUCATION/TRAINING PROGRAM

## 2023-03-17 RX ORDER — PREDNISONE 20 MG/1
40 TABLET ORAL DAILY
Qty: 10 TABLET | Refills: 0 | Status: SHIPPED | OUTPATIENT
Start: 2023-03-17 | End: 2023-03-22

## 2023-03-17 RX ORDER — CLOBETASOL PROPIONATE 0.5 MG/G
AEROSOL, FOAM TOPICAL
COMMUNITY
Start: 2023-02-28

## 2023-03-17 RX ORDER — FLUTICASONE PROPIONATE 50 MCG
1 SPRAY, SUSPENSION (ML) NASAL DAILY
Qty: 16 G | Refills: 1 | Status: SHIPPED | OUTPATIENT
Start: 2023-03-17

## 2023-03-17 RX ORDER — AMOXICILLIN 500 MG/1
500 TABLET, FILM COATED ORAL 2 TIMES DAILY
COMMUNITY
Start: 2023-03-10

## 2023-03-17 RX ORDER — KETOCONAZOLE 20 MG/ML
SHAMPOO, SUSPENSION TOPICAL
COMMUNITY
Start: 2023-02-27

## 2023-03-17 RX ORDER — LIDOCAINE HYDROCHLORIDE 20 MG/ML
SOLUTION ORAL; TOPICAL
COMMUNITY
Start: 2023-03-10

## 2023-03-17 NOTE — PROGRESS NOTES
03/17/2023    Jina Casrto  [id number]    Chief Complaint   Patient presents with    Hoarse           Fatigue              HPI    This patient is new to me and presents for acute concern. pMh sig for multinodular goiter s/p in Jan. Now with new hoarseness for the last 3 weeks. Has tried vocal rest and ibuprofen without any improvement.  Informed surgeon and was provided reassurance.      Negative 10 point ROS outside of HPI    Social History     Socioeconomic History    Marital status: Single   Tobacco Use    Smoking status: Never    Smokeless tobacco: Never   Substance and Sexual Activity    Alcohol use: Not Currently     Comment: socially    Drug use: No    Sexual activity: Yes     Partners: Male           Current Outpatient Medications:     clobetasoL-emollient (OLUX-E) 0.05 % topical foam, Apply topically., Disp: , Rfl:     ibuprofen (ADVIL,MOTRIN) 800 MG tablet, Take 1 tablet (800 mg total) by mouth 3 (three) times daily. W meal and glass of water for 3-10 days, Disp: 30 tablet, Rfl: 0    ketoconazole (NIZORAL) 2 % shampoo, Apply topically., Disp: , Rfl:     ondansetron (ZOFRAN-ODT) 4 MG TbDL, Take 1 tablet (4 mg total) by mouth every 6 (six) hours as needed (nausea)., Disp: 15 tablet, Rfl: 0    amoxicillin (AMOXIL) 500 MG Tab, Take 500 mg by mouth 2 (two) times daily., Disp: , Rfl:     HYDROcodone-acetaminophen (NORCO) 5-325 mg per tablet, Take 1 tablet by mouth every 6 (six) hours as needed. (Patient not taking: Reported on 3/17/2023), Disp: 20 tablet, Rfl: 0    LIDOCAINE VISCOUS 2 % solution, Take by mouth., Disp: , Rfl:       Physical Exam  Vitals:    03/17/23 1450   BP: 125/77   Pulse: 85   Temp: 98.5 °F (36.9 °C)     Gen: well appearing, NAD  Resp: non labored breathing, no crackles, no wheezes, CTAB  Nose: bilateral congestion of turbinates, sinuses non-tender to touch,   Throat: no erythema, no exudates  CV: RRR no murmur, gallops, rubs, no LE edema      1. Seasonal allergies  -START  fluticasone  propionate (FLONASE) 50 mcg/actuation nasal spray; 1 spray (50 mcg total) by Each Nostril route once daily.  Dispense: 16 g; Refill: 1    2. Laryngitis, acute  - START predniSONE (DELTASONE) 20 MG tablet; Take 2 tablets (40 mg total) by mouth once daily. for 5 days  Dispense: 10 tablet; Refill: 0  -recommended continued vocal rest   Recommended scheduling appointment with ENT    RTC as needed    Ruth Graham MD  Family Medicine

## 2023-03-17 NOTE — LETTER
March 17, 2023      [facility]  [address]  [facility] [address]  Phone: [phone]  Fax: [phone]       Patient: Jina Castro   YOB: 1975  Date of Visit: 03/17/2023    To Whom It May Concern:    Albert Castro  was at [facility] on 03/17/2023. Patient may return to work with the following restrictions:          -Vocal rest/limited talking until voice returns          -Please allow short break (15 min) every 2 hours through 3/24   If you have any questions or concerns, or if I can be of further assistance, please do not hesitate to contact me.    Sincerely,    Ruth Graham MD

## 2023-03-21 PROBLEM — Z98.890 STATUS POST THYROIDECTOMY: Status: ACTIVE | Noted: 2023-03-21

## 2023-03-21 PROBLEM — Z90.89 STATUS POST THYROIDECTOMY: Status: ACTIVE | Noted: 2023-03-21

## 2023-03-21 PROBLEM — E89.0 STATUS POST THYROIDECTOMY: Status: ACTIVE | Noted: 2023-03-21

## 2023-03-21 NOTE — PROGRESS NOTES
"    /80 (BP Location: Left arm, Patient Position: Sitting, BP Method: Medium (Manual))   Pulse 79   Temp 98 °F (36.7 °C) (Oral)   Ht 5' 2" (1.575 m)   Wt 74 kg (163 lb 2.3 oz)   LMP  (LMP Unknown)   SpO2 99%   BMI 29.84 kg/m²       ===========    Chief Complaint: No chief complaint on file.        Jina Castro is a 47 y.o. female here for    HPI    Sore throat and hoarseness going on for well over a week.  No fevers or chills.  Some submandibular lymphadenopathy.  Not much cough.  Taking over-the-counter medications without relief.  Negative COVID test at home    Status post thyroidectomy.    SURGICAL AND MEDICAL HISTORY: updated and reviewed.  ALLERGIES updated and reviewed.  Review of patient's allergies indicates:  No Known Allergies  CURRENT OUTPATIENT MEDICATIONS updated and reviewed    Current Outpatient Medications:     HYDROcodone-acetaminophen (NORCO) 5-325 mg per tablet, Take 1 tablet by mouth every 6 (six) hours as needed. (Patient not taking: Reported on 3/17/2023), Disp: 20 tablet, Rfl: 0    ondansetron (ZOFRAN-ODT) 4 MG TbDL, Take 1 tablet (4 mg total) by mouth every 6 (six) hours as needed (nausea)., Disp: 15 tablet, Rfl: 0    amoxicillin (AMOXIL) 500 MG Tab, Take 500 mg by mouth 2 (two) times daily., Disp: , Rfl:     clobetasoL-emollient (OLUX-E) 0.05 % topical foam, Apply topically., Disp: , Rfl:     fluticasone propionate (FLONASE) 50 mcg/actuation nasal spray, 1 spray (50 mcg total) by Each Nostril route once daily., Disp: 16 g, Rfl: 1    ibuprofen (ADVIL,MOTRIN) 800 MG tablet, Take 1 tablet (800 mg total) by mouth 3 (three) times daily. W meal and glass of water for 3-10 days, Disp: 30 tablet, Rfl: 0    ketoconazole (NIZORAL) 2 % shampoo, Apply topically., Disp: , Rfl:     LIDOCAINE VISCOUS 2 % solution, Take by mouth., Disp: , Rfl:     predniSONE (DELTASONE) 20 MG tablet, Take 2 tablets (40 mg total) by mouth once daily. for 5 days, Disp: 10 tablet, Rfl: 0    Review of " "Systems   Constitutional:  Negative for activity change, appetite change, chills, diaphoresis, fatigue, fever and unexpected weight change.   HENT:  Positive for congestion, sinus pressure and sore throat. Negative for ear discharge, ear pain, facial swelling, hearing loss, nosebleeds, postnasal drip, rhinorrhea, sneezing, tinnitus, trouble swallowing and voice change.    Eyes:  Negative for photophobia, pain, discharge, redness, itching and visual disturbance.   Respiratory:  Negative for cough, chest tightness, shortness of breath and wheezing.    Cardiovascular:  Negative for chest pain, palpitations and leg swelling.   Gastrointestinal:  Negative for abdominal distention, abdominal pain, anal bleeding, blood in stool, constipation, diarrhea, nausea, rectal pain and vomiting.   Endocrine: Negative for cold intolerance, heat intolerance, polydipsia, polyphagia and polyuria.   Genitourinary:  Negative for difficulty urinating, dysuria and flank pain.   Musculoskeletal:  Negative for arthralgias, back pain, joint swelling, myalgias and neck pain.   Skin:  Negative for rash.   Neurological:  Negative for dizziness, tremors, seizures, syncope, speech difficulty, weakness, light-headedness, numbness and headaches.   Psychiatric/Behavioral:  Negative for behavioral problems, confusion, decreased concentration, dysphoric mood, sleep disturbance and suicidal ideas. The patient is not nervous/anxious and is not hyperactive.      /80 (BP Location: Left arm, Patient Position: Sitting, BP Method: Medium (Manual))   Pulse 79   Temp 98 °F (36.7 °C) (Oral)   Ht 5' 2" (1.575 m)   Wt 74 kg (163 lb 2.3 oz)   LMP  (LMP Unknown)   SpO2 99%   BMI 29.84 kg/m²   Physical Exam  Vitals and nursing note reviewed.   Constitutional:       General: She is not in acute distress.     Appearance: Normal appearance. She is well-developed. She is not ill-appearing or toxic-appearing.   HENT:      Head: Normocephalic and atraumatic.    "   Right Ear: Tympanic membrane, ear canal and external ear normal.      Left Ear: Tympanic membrane, ear canal and external ear normal.      Nose: Nose normal.      Mouth/Throat:      Lips: Pink.      Mouth: Mucous membranes are moist.      Pharynx: Posterior oropharyngeal erythema present. No oropharyngeal exudate.   Eyes:      General: No scleral icterus.        Right eye: No discharge.         Left eye: No discharge.      Extraocular Movements: Extraocular movements intact.      Conjunctiva/sclera: Conjunctivae normal.   Cardiovascular:      Rate and Rhythm: Normal rate and regular rhythm.      Pulses: Normal pulses.      Heart sounds: Normal heart sounds. No murmur heard.  Pulmonary:      Effort: Pulmonary effort is normal. No respiratory distress.      Breath sounds: Normal breath sounds. No wheezing or rales.   Musculoskeletal:      Cervical back: Normal range of motion and neck supple. No rigidity or tenderness.   Lymphadenopathy:      Cervical: No cervical adenopathy.   Skin:     General: Skin is warm and dry.   Neurological:      Mental Status: She is alert. Mental status is at baseline.   Psychiatric:         Mood and Affect: Mood normal.         Behavior: Behavior normal. Behavior is cooperative.       Diagnoses and all orders for this visit:    Hoarseness    Status post thyroidectomy    Fatigue, unspecified type  -     CBC W/ AUTO DIFFERENTIAL; Future  -     Basic Metabolic Panel; Future    Other orders  -     ibuprofen (ADVIL,MOTRIN) 800 MG tablet; Take 1 tablet (800 mg total) by mouth 3 (three) times daily. W meal and glass of water for 3-10 days      Warm salt water gargles.  Anti-inflammatories.      Continue voice rest.  This may gone for a couple more weeks after thyroidectomy.      If not improving in 2 weeks then follow back up.    All lab results over past 2 years available reviewed inc labs and any cardiology or radiology studies  Any new prescription medications gone over in detail including  reason for taking the medication, the general mechanism of action, most common possible side effects and possible costs, etcetera.  Jas Ayers MD

## 2023-03-23 ENCOUNTER — PATIENT MESSAGE (OUTPATIENT)
Dept: PRIMARY CARE CLINIC | Facility: CLINIC | Age: 48
End: 2023-03-23
Payer: COMMERCIAL

## 2023-03-28 ENCOUNTER — PATIENT MESSAGE (OUTPATIENT)
Dept: PRIMARY CARE CLINIC | Facility: CLINIC | Age: 48
End: 2023-03-28
Payer: COMMERCIAL

## 2023-03-31 ENCOUNTER — PATIENT MESSAGE (OUTPATIENT)
Dept: PRIMARY CARE CLINIC | Facility: CLINIC | Age: 48
End: 2023-03-31
Payer: COMMERCIAL

## 2024-08-14 ENCOUNTER — TELEPHONE (OUTPATIENT)
Dept: DERMATOLOGY | Facility: CLINIC | Age: 49
End: 2024-08-14
Payer: COMMERCIAL

## 2024-08-14 NOTE — TELEPHONE ENCOUNTER
I spoke with Mrs. Castro about a message in my in-basket that she would like to make an appointment with Dr. Cates.  I informed her that Dr. Cates is no longer seeing pt and is currently weaning off her pts.  I offered her an appointment with Dr. Del Real on 09/10/2024 at 2:30 PM. She accepted the appointment and thanked me for the call.

## 2024-08-15 ENCOUNTER — TELEPHONE (OUTPATIENT)
Dept: DERMATOLOGY | Facility: CLINIC | Age: 49
End: 2024-08-15
Payer: COMMERCIAL

## 2024-08-15 NOTE — TELEPHONE ENCOUNTER
----- Message from Norma Stinson sent at 8/15/2024  9:13 AM CDT -----  Regarding: pt advice  PATIENT CALL    Pt called regarding light therapy, states that she has a few questions. Please call back at [phone]

## 2024-08-16 ENCOUNTER — OFFICE VISIT (OUTPATIENT)
Dept: PRIMARY CARE CLINIC | Facility: CLINIC | Age: 49
End: 2024-08-16
Payer: COMMERCIAL

## 2024-08-16 VITALS
BODY MASS INDEX: 31.13 KG/M2 | WEIGHT: 169.19 LBS | DIASTOLIC BLOOD PRESSURE: 77 MMHG | HEART RATE: 80 BPM | SYSTOLIC BLOOD PRESSURE: 115 MMHG | OXYGEN SATURATION: 98 % | HEIGHT: 62 IN

## 2024-08-16 DIAGNOSIS — Z00.00 ANNUAL PHYSICAL EXAM: Primary | ICD-10-CM

## 2024-08-16 DIAGNOSIS — C84.A8 CUTANEOUS T-CELL LYMPHOMA INVOLVING LYMPH NODES OF MULTIPLE REGIONS: ICD-10-CM

## 2024-08-16 DIAGNOSIS — Z12.11 COLON CANCER SCREENING: ICD-10-CM

## 2024-08-16 PROCEDURE — 99999 PR PBB SHADOW E&M-EST. PATIENT-LVL III: CPT | Mod: PBBFAC,,, | Performed by: FAMILY MEDICINE

## 2024-08-16 RX ORDER — CLOBETASOL PROPIONATE 0.5 MG/G
AEROSOL, FOAM TOPICAL
COMMUNITY
Start: 2024-08-15 | End: 2024-08-16

## 2024-08-16 NOTE — PROGRESS NOTES
Clinic Note  8/16/2024      Subjective:       Patient ID:  Jina is a 49 y.o. female being seen for an new visit.      Chief Complaint: Annual Exam    Annual exam-patient with cutaneous T-cell lymphoma, thyroidectomy for goiter here for annual exam.  Just had dermatology visit and had skin biopsy.      Family History   Problem Relation Name Age of Onset    Stroke Mother      Clotting disorder Father      Diabetes Maternal Grandmother      Melanoma Neg Hx      Psoriasis Neg Hx      Lupus Neg Hx       Social History     Socioeconomic History    Marital status: Single   Tobacco Use    Smoking status: Never    Smokeless tobacco: Never   Substance and Sexual Activity    Alcohol use: Not Currently     Comment: socially    Drug use: No    Sexual activity: Yes     Partners: Male     Past Surgical History:   Procedure Laterality Date    THYROIDECTOMY Left 01/09/2023    Procedure: THYROIDECTOMY-NIM tube;  Surgeon: Nelli Jarvis MD;  Location: [facility] OR [address] [place] [address];  Service: ENT;  Laterality: Left;    THYROIDECTOMY       Medication List with Changes/Refills   Current Medications    AMOXICILLIN (AMOXIL) 500 MG TAB    Take 500 mg by mouth 2 (two) times daily.    CLOBETASOL (OLUX) 0.05 % FOAM    Apply topically.    CLOBETASOL-EMOLLIENT (OLUX-E) 0.05 % TOPICAL FOAM    Apply topically.    FLUTICASONE PROPIONATE (FLONASE) 50 MCG/ACTUATION NASAL SPRAY    1 spray (50 mcg total) by Each Nostril route once daily.    HYDROCODONE-ACETAMINOPHEN (NORCO) 5-325 MG PER TABLET    Take 1 tablet by mouth every 6 (six) hours as needed.    IBUPROFEN (ADVIL,MOTRIN) 800 MG TABLET    Take 1 tablet (800 mg total) by mouth 3 (three) times daily. W meal and glass of water for 3-10 days    KETOCONAZOLE (NIZORAL) 2 % SHAMPOO    Apply topically.    LIDOCAINE VISCOUS 2 % SOLUTION    Take by mouth.    ONDANSETRON (ZOFRAN-ODT) 4 MG TBDL    Take 1 tablet (4 mg total) by mouth every 6 (six) hours as needed (nausea).     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   HENT:  Negative for congestion, sinus pain and sore throat.    Respiratory:  Negative for cough, shortness of breath and wheezing.    Cardiovascular:  Negative for chest pain and palpitations.   Gastrointestinal:  Negative for constipation, diarrhea, nausea and vomiting.   Genitourinary:  Negative for dysuria, frequency and urgency.   Musculoskeletal:  Negative for myalgias.   Skin:  Negative for rash.   Neurological:  Negative for headaches.         Objective:      /77 (BP Location: Right arm, Patient Position: Sitting, BP Method: Large (Automatic))   Pulse 80   Ht 5' 2" (1.575 m)   Wt 76.8 kg (169 lb 3.3 oz)   LMP  (LMP Unknown)   SpO2 98%   BMI 30.95 kg/m²   Estimated body mass index is 30.95 kg/m² as calculated from the following:    Height as of this encounter: 5' 2" (1.575 m).    Weight as of this encounter: 76.8 kg (169 lb 3.3 oz).  Physical Exam  Vitals reviewed.   Constitutional:       General: She is not in acute distress.     Appearance: She is not diaphoretic.   HENT:      Head: Normocephalic and atraumatic.   Eyes:      Conjunctiva/sclera: Conjunctivae normal.   Cardiovascular:      Rate and Rhythm: Normal rate and regular rhythm.      Heart sounds: Normal heart sounds.   Pulmonary:      Effort: Pulmonary effort is normal. No respiratory distress.      Breath sounds: Normal breath sounds. No wheezing.   Abdominal:      General: Bowel sounds are normal.      Palpations: Abdomen is soft.   Musculoskeletal:         General: Normal range of motion.      Cervical back: Normal range of motion.   Skin:     General: Skin is warm and dry.      Findings: No erythema or rash.   Neurological:      Mental Status: She is alert and oriented to person, place, and time. "   Psychiatric:         Mood and Affect: Mood and affect normal.         Behavior: Behavior normal.         Thought Content: Thought content normal.         Judgment: Judgment normal.           Assessment and Plan:     1. Annual physical exam  - CBC Auto Differential; Future  - Comprehensive Metabolic Panel; Future  - Lipid Panel; Future  - TSH; Future  - Hemoglobin A1C; Future  - Hepatitis C Antibody; Future  - HIV 1/2 Ag/Ab (4th Gen); Future    2. 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 Colon cancer screening  - Cologuard Screening (Multitarget Stool DNA); Future  - Cologuard Screening (Multitarget Stool DNA)        Follow up:   No follow-ups on file.     Other Orders Placed This Visit:  No orders of the defined types were placed in this encounter.          Jeovany Rogers MD        This note is dictated on M*Modal word recognition program.  There are word recognition mistakes that are occasionally missed on review.

## 2024-08-22 ENCOUNTER — LAB VISIT (OUTPATIENT)
Dept: LAB | Facility: HOSPITAL | Age: 49
End: 2024-08-22
Attending: FAMILY MEDICINE
Payer: COMMERCIAL

## 2024-08-22 DIAGNOSIS — Z00.00 ANNUAL PHYSICAL EXAM: ICD-10-CM

## 2024-08-22 LAB
ALBUMIN SERPL BCP-MCNC: 3.8 G/DL (ref 3.5–5.2)
ALP SERPL-CCNC: 74 U/L (ref 55–135)
ALT SERPL W/O P-5'-P-CCNC: 7 U/L (ref 10–44)
ANION GAP SERPL CALC-SCNC: 6 MMOL/L (ref 8–16)
AST SERPL-CCNC: 14 U/L (ref 10–40)
BASOPHILS # BLD AUTO: 0.09 K/UL (ref 0–0.2)
BASOPHILS NFR BLD: 1.1 % (ref 0–1.9)
BILIRUB SERPL-MCNC: 0.7 MG/DL (ref 0.1–1)
BUN SERPL-MCNC: 13 MG/DL (ref 6–20)
CALCIUM SERPL-MCNC: 9.4 MG/DL (ref 8.7–10.5)
CHLORIDE SERPL-SCNC: 106 MMOL/L (ref 95–110)
CHOLEST SERPL-MCNC: 185 MG/DL (ref 120–199)
CHOLEST/HDLC SERPL: 3.2 {RATIO} (ref 2–5)
CO2 SERPL-SCNC: 29 MMOL/L (ref 23–29)
CREAT SERPL-MCNC: 0.9 MG/DL (ref 0.5–1.4)
DIFFERENTIAL METHOD BLD: ABNORMAL
EOSINOPHIL # BLD AUTO: 0.3 K/UL (ref 0–0.5)
EOSINOPHIL NFR BLD: 3.5 % (ref 0–8)
ERYTHROCYTE [DISTWIDTH] IN BLOOD BY AUTOMATED COUNT: 13.4 % (ref 11.5–14.5)
EST. GFR  (NO RACE VARIABLE): >60 ML/MIN/1.73 M^2
ESTIMATED AVG GLUCOSE: 103 MG/DL (ref 68–131)
GLUCOSE SERPL-MCNC: 93 MG/DL (ref 70–110)
HBA1C MFR BLD: 5.2 % (ref 4–5.6)
HCT VFR BLD AUTO: 38.1 % (ref 37–48.5)
HCV AB SERPL QL IA: NORMAL
HDLC SERPL-MCNC: 57 MG/DL (ref 40–75)
HDLC SERPL: 30.8 % (ref 20–50)
HGB BLD-MCNC: 12.2 G/DL (ref 12–16)
HIV 1+2 AB+HIV1 P24 AG SERPL QL IA: NORMAL
IMM GRANULOCYTES # BLD AUTO: 0.03 K/UL (ref 0–0.04)
IMM GRANULOCYTES NFR BLD AUTO: 0.4 % (ref 0–0.5)
LDLC SERPL CALC-MCNC: 119.2 MG/DL (ref 63–159)
LYMPHOCYTES # BLD AUTO: 2.4 K/UL (ref 1–4.8)
LYMPHOCYTES NFR BLD: 30 % (ref 18–48)
MCH RBC QN AUTO: 30.9 PG (ref 27–31)
MCHC RBC AUTO-ENTMCNC: 32 G/DL (ref 32–36)
MCV RBC AUTO: 97 FL (ref 82–98)
MONOCYTES # BLD AUTO: 0.8 K/UL (ref 0.3–1)
MONOCYTES NFR BLD: 10.4 % (ref 4–15)
NEUTROPHILS # BLD AUTO: 4.4 K/UL (ref 1.8–7.7)
NEUTROPHILS NFR BLD: 54.6 % (ref 38–73)
NONHDLC SERPL-MCNC: 128 MG/DL
NRBC BLD-RTO: 0 /100 WBC
PLATELET # BLD AUTO: 305 K/UL (ref 150–450)
PMV BLD AUTO: 11.7 FL (ref 9.2–12.9)
POTASSIUM SERPL-SCNC: 4.2 MMOL/L (ref 3.5–5.1)
PROT SERPL-MCNC: 7.2 G/DL (ref 6–8.4)
RBC # BLD AUTO: 3.95 M/UL (ref 4–5.4)
SODIUM SERPL-SCNC: 141 MMOL/L (ref 136–145)
TRIGL SERPL-MCNC: 44 MG/DL (ref 30–150)
TSH SERPL DL<=0.005 MIU/L-ACNC: 1.05 UIU/ML (ref 0.4–4)
WBC # BLD AUTO: 8 K/UL (ref 3.9–12.7)

## 2024-08-22 PROCEDURE — 84443 ASSAY THYROID STIM HORMONE: CPT | Performed by: FAMILY MEDICINE

## 2024-08-22 PROCEDURE — 36415 COLL VENOUS BLD VENIPUNCTURE: CPT | Mod: PN | Performed by: FAMILY MEDICINE

## 2024-08-22 PROCEDURE — 85025 COMPLETE CBC W/AUTO DIFF WBC: CPT | Performed by: FAMILY MEDICINE

## 2024-08-22 PROCEDURE — 87389 HIV-1 AG W/HIV-1&-2 AB AG IA: CPT | Performed by: FAMILY MEDICINE

## 2024-08-22 PROCEDURE — 86803 HEPATITIS C AB TEST: CPT | Performed by: FAMILY MEDICINE

## 2024-08-22 PROCEDURE — 80061 LIPID PANEL: CPT | Performed by: FAMILY MEDICINE

## 2024-08-22 PROCEDURE — 83036 HEMOGLOBIN GLYCOSYLATED A1C: CPT | Performed by: FAMILY MEDICINE

## 2024-08-22 PROCEDURE — 80053 COMPREHEN METABOLIC PANEL: CPT | Performed by: FAMILY MEDICINE

## 2025-05-16 DIAGNOSIS — Z98.890 STATUS POST THYROIDECTOMY: Primary | ICD-10-CM

## 2025-05-16 DIAGNOSIS — Z90.89 STATUS POST THYROIDECTOMY: Primary | ICD-10-CM

## 2025-05-24 ENCOUNTER — HOSPITAL ENCOUNTER (OUTPATIENT)
Dept: RADIOLOGY | Facility: OTHER | Age: 50
Discharge: HOME | End: 2025-05-24
Attending: STUDENT IN AN ORGANIZED HEALTH CARE EDUCATION/TRAINING PROGRAM
Payer: COMMERCIAL

## 2025-05-24 DIAGNOSIS — Z98.890 STATUS POST THYROIDECTOMY: ICD-10-CM

## 2025-05-24 DIAGNOSIS — Z90.89 STATUS POST THYROIDECTOMY: ICD-10-CM

## 2025-05-24 PROCEDURE — 76536 US EXAM OF HEAD AND NECK: CPT | Mod: TC

## 2025-05-27 ENCOUNTER — OFFICE VISIT (OUTPATIENT)
Dept: OTOLARYNGOLOGY | Facility: CLINIC | Age: 50
End: 2025-05-27
Payer: COMMERCIAL

## 2025-05-27 DIAGNOSIS — E04.9 GOITER: Primary | ICD-10-CM

## 2025-05-27 PROCEDURE — 1159F MED LIST DOCD IN RCRD: CPT | Mod: CPTII,S$GLB,, | Performed by: STUDENT IN AN ORGANIZED HEALTH CARE EDUCATION/TRAINING PROGRAM

## 2025-05-27 PROCEDURE — 31575 DIAGNOSTIC LARYNGOSCOPY: CPT | Mod: S$GLB,,, | Performed by: STUDENT IN AN ORGANIZED HEALTH CARE EDUCATION/TRAINING PROGRAM

## 2025-05-27 PROCEDURE — 99999 PR PBB SHADOW E&M-EST. PATIENT-LVL II: CPT | Mod: PBBFAC,,, | Performed by: STUDENT IN AN ORGANIZED HEALTH CARE EDUCATION/TRAINING PROGRAM

## 2025-05-27 PROCEDURE — 99214 OFFICE O/P EST MOD 30 MIN: CPT | Mod: 25,S$GLB,, | Performed by: STUDENT IN AN ORGANIZED HEALTH CARE EDUCATION/TRAINING PROGRAM

## 2025-05-27 RX ORDER — BEXAROTENE 75 MG/1
CAPSULE ORAL
COMMUNITY
Start: 2025-03-06

## 2025-05-27 NOTE — PROGRESS NOTES
Note to patients: In accordance with the  Cures Act, patients are now granted immediate electronic access to their medical records. This note is primarily intended for communication among medical professionals. As a result, it may incorporate medical terminology, abbreviations, or language that could appear blunt or unfamiliar. If you have questions about this document, we encourage you to discuss it with your physician.      [facility]    Patient: Jina Castro    : 1975    MRN: [id number]  Referring Provider: Dr. Jarvis  Date of Encounter: 2025    DIAGNOSIS: multinodular goiter    CC:   Chief Complaint   Patient presents with    f/u L thyroidectomy       HPI:   Jina Castro is a 49 y.o. female who underwent left thyroidectomy with Dr. Jarvis 23 presenting for followup. She went to see her dermatologist who noted the presence of her remaining right goiter. Patient denies pain, growth, or concerns in her neck. She reports some hoarseness after her thyroid surgery but her voice is at baseline.    TREATMENT HISTORY:  left thyroidectomy with Dr. Jarvis 23    ALLERGIES:  Review of patient's allergies indicates:  No Known Allergies      MEDICATIONS:  Current Medications[1]    PAST MEDICAL HISTORY:  No past medical history on file.     PAST SURGICAL HISTORY:  Past Surgical History:   Procedure Laterality Date    THYROIDECTOMY Left 2023    Procedure: THYROIDECTOMY-NIM tube;  Surgeon: Nelli Jarvis MD;  Location: [facility] OR [address] [place] [address];  Service: ENT;  Laterality: Left;    THYROIDECTOMY          FAMILY HISTORY:  Family History   Problem Relation Name Age of Onset    Stroke Mother      Clotting disorder Father      Diabetes Maternal Grandmother      Melanoma Neg Hx      Psoriasis Neg Hx      Lupus Neg Hx         SOCIAL HISTORY:  Social History[2]  See above substance history    REVIEW OF SYSTEMS:   Comprehensive review of  systems was discussed with the patient.  It is positive only for the above complaints.    PHYSICAL EXAMINATION:  There were no vitals taken for this visit.    Constitutional: Non-toxic appearing.   Psychiatric: Appropriate mood and affect. Cooperative.  Voice: Non-dysphonic, speaking in full sentences.   Neurologic: Cranial nerves grossly intact, no focal deficits.  Head and face: Salivary glands are not enlarged. Face is symmetric. CN VII strength intact.  Skin: No concerning skin lesions.   Eyes: Vision grossly intact, bilateral extraocular movements intact  Ears: Bilateral pinna, mastoid, external ear canal normal. Hearing intact.   Nose: External nose appears normal.   Lips: No ulcers or lesions  Neck: Soft and flat, thyroidectomy scar c/d/I, large 8cm palpable right thyroid, trachea also palpable and midline  Respiratory: Chest expansion symmetric, no audible stridor or stertor. Breathing is unlabored. No active cough.    PROCEDURES:    FLEXIBLE LARYNGOSCOPY  Provider: Lea Teague MD  Indication: History of thyroidectomy  The patient was unable to tolerate mirror laryngoscopy.  The procedure was explained to the patient and verbal consent was obtained.   Anesthesia: topical lidocaine and neosynephrine applied within one or both nares with an atomizer    The scope was introduced in the usual fashion.    Findings:  Mucosa: normal  Inferior turbinates: no polypoid changes or hypertrophy  Septum: no lesion or ulcer  Middle meatus: no purulence or polypoid change  Nasopharynx:  Adenoids: normal  Fossa of Rosenmuller: normal  Eustachian tubes: normal  Superior and posterior pharyngeal walls: normal   Epiglottis, vallecula, and base of tongue: normal   Pyriform sinuses: no pooling of secretions or saliva in pyriform sinuses, mucosa normal  False vocal cords, arytenoids, aryepiglottic folds: normal  True vocal cords are symmetrically mobile on phonation and inspiration.   Laryngeal sensation appears intact. There are  no masses or ulcerations.     The scope was withdrawn. The patient tolerated the procedure well with no complications.      DATA REVIEWED:     LABORATORY:      Latest Ref Rng & Units 2/24/2023     1:26 PM 3/15/2023     8:10 AM 8/22/2024    10:15 AM   Thyroid Labs   TSH 0.400 - 4.000 uIU/mL 0.484   1.050    Free T4 0.71 - 1.51 ng/dL 0.82      Sodium 136 - 145 mmol/L  142  141    Potassium 3.5 - 5.1 mmol/L  4.0  4.2    Chloride 95 - 110 mmol/L  106  106    Carbon Dioxide 23 - 29 mmol/L  29  29    Glucose 70 - 110 mg/dL  90  93    Blood Urea Nitrogen 6 - 20 mg/dL  13  13    Creatinine 0.5 - 1.4 mg/dL  0.8  0.9    Calcium 8.7 - 10.5 mg/dL  9.4  9.4    Total Protein 6.0 - 8.4 g/dL   7.2    Albumin 3.5 - 5.2 g/dL   3.8    Total Bilirubin 0.1 - 1.0 mg/dL   0.7    AST 10 - 40 U/L   14    ALT 10 - 44 U/L   7    Anion Gap 8 - 16 mmol/L  7  6    WBC 3.90 - 12.70 K/uL  8.95  8.00    RBC 4.00 - 5.40 M/uL  4.08  3.95    Hemoglobin 12.0 - 16.0 g/dL  12.6  12.2    Hematocrit 37.0 - 48.5 %  40.1  38.1    MCV 82 - 98 fL  98  97    MCH 27.0 - 31.0 pg  30.9  30.9    MCHC 32.0 - 36.0 g/dL  31.4  32.0    RDW 11.5 - 14.5 %  13.9  13.4    Platelets 150 - 450 K/uL  346  305    MPV 9.2 - 12.9 fL  11.6  11.7    Gran # 1.8 - 7.7 K/uL  4.5  4.4    Lymph # 1.0 - 4.8 K/uL  2.8  2.4    Mono # 0.3 - 1.0 K/uL  0.8  0.8    Eos # 0.0 - 0.5 K/uL  0.8  0.3    Baso # 0.00 - 0.20 K/uL  0.09  0.09    Gran % 38.0 - 73.0 %  50.4  54.6    Lymph % 18.0 - 48.0 %  31.3  30.0    Mono% 4.0 - 15.0 %  8.5  10.4    Eos % 0.0 - 8.0 %  8.4  3.5    Baso % 0.0 - 1.9 %  1.0  1.1       PATHOLOGY:  RELIAPATH DIAGNOSIS:   THYROID, LEFT LOBE, HEMITHYROIDECTOMY:   - Benign thyroid parenchyma showing adenomatoid nodules in the background of   multinodular hyperplasia, consistent with clinical goiter.   - Negative for malignancy.     IMAGING:  US 5/26/25  Status post left hemithyroidectomy.  Enlarged multinodular right thyroid lobe.  4.3 cm TR 3 nodule and 2.5 cm TR 4 nodule  meet criteria for FNA if not previously performed.  Additional right thyroid nodule meets criteria for continued follow-up with repeat ultrasound in 1 year.    CT 12/2022  Multinodular goiter as above, with dominant 7.1 cm left thyroid lobe nodule, overall appearance corresponding with prior ultrasounds.  Thyroid has been biopsied previously, with pathology-reported atypia of unknown significance.     Prominent nonenlarged cervical and left axillary nodes, in this patient with history of cutaneous T-cell lymphoma.  Consider correlation and follow-up as clinically indicated.    US 2/18/22  Multinodular goiter, overall stable from prior exams.  The 4 most suspicious nodules are reported for follow-up purposes, 2 of which have been previously biopsied, as detailed above.  There are two 5.4 cm TR 3 nodules within the midpole of the right thyroid lobe that have remained stable but technically both meet criteria for FNA.  The lateral nodule has previously been biopsied with insufficient tissue for diagnosis.  Clinical considerations will determine if FNA is warranted versus sonographic follow-up in 1 year.  Diffusely increased thyroid vascularity, which can be seen with thyroiditis.    RADIOLOGY REVIEWED:  I have independently viewed and agree with the above images and reports which demonstrate thyroid goiter as described.    ASSESSMENT AND PLAN:  1. Goiter       Jina Castro is a 49 y.o. female with known multinodular goiter, s/p left hemithyroidectomy, presenting for followup.  - Will plan on repeating FNA as it has been 3 years since her last and the nodule have grown.  - We discussed options for management. Will likely opt for continued surveillance so that patient can avoid thyroid supplementation if no concerns for cancer. If needed, no signs of recurrent laryngeal nerve injury on scope exam and so possibility of revision surgery should be somewhat safer.  - Patient amenable to ThyGeNEXT and ThyraMIR v2  oncogene panels for further testing if needed after FNA    Orders Placed This Encounter    Ambulatory referral/consult to Interventional RAD        Patient encouraged to call with any questions, concerns, or new or worsening symptoms.     Follow up after FNA            [1]   Current Outpatient Medications:     bexarotene 75 mg Cap, Take by mouth., Disp: , Rfl:   [2]   Social History  Socioeconomic History    Marital status: Single   Tobacco Use    Smoking status: Never    Smokeless tobacco: Never   Substance and Sexual Activity    Alcohol use: Not Currently     Comment: socially    Drug use: No    Sexual activity: Yes     Partners: Male

## 2025-05-29 DIAGNOSIS — E04.9 GOITER: Primary | ICD-10-CM

## 2025-06-25 ENCOUNTER — HOSPITAL ENCOUNTER (OUTPATIENT)
Dept: RADIOLOGY | Facility: OTHER | Age: 50
Discharge: HOME | End: 2025-06-25
Attending: FAMILY MEDICINE
Payer: COMMERCIAL

## 2025-06-25 DIAGNOSIS — E04.9 GOITER: ICD-10-CM

## 2025-06-25 PROCEDURE — 88177 CYTP FNA EVAL EA ADDL: CPT | Mod: TC,91 | Performed by: STUDENT IN AN ORGANIZED HEALTH CARE EDUCATION/TRAINING PROGRAM

## 2025-06-25 RX ORDER — LIDOCAINE HYDROCHLORIDE 10 MG/ML
5 INJECTION, SOLUTION INFILTRATION; PERINEURAL ONCE
Status: DISCONTINUED | OUTPATIENT
Start: 2025-06-25 | End: 2025-06-26 | Stop reason: HOSPADM

## 2025-06-25 NOTE — DISCHARGE SUMMARY
Radiology Discharge Summary      Hospital Course: No complications    Admit Date: 6/25/2025  Discharge Date: 06/25/2025     Instructions Given to Patient: Yes  Diet: Resume prior diet  Activity: activity as tolerated    Description of Condition on Discharge: Stable  Vital Signs (Most Recent):      Discharge Disposition: Home    Discharge Diagnosis: thryoid nodule    Follow up: As scheduled    Eden Richardson MD  Interventional Radiology

## 2025-06-25 NOTE — H&P
"Interventional Radiology Pre-Procedure History & Physical      Chief Complaint/Reason for Referral: multinodular thyroid    History of Present Illness:  Jina Castro is a 49 y.o. female who presents for thyroid nodule FNA.     No past medical history on file.  Past Surgical History:   Procedure Laterality Date    THYROIDECTOMY Left 01/09/2023    Procedure: THYROIDECTOMY-NIM tube;  Surgeon: Nelli Jarvis MD;  Location: [facility] OR [address] [place] [address];  Service: ENT;  Laterality: Left;    THYROIDECTOMY         Allergies:   Review of patient's allergies indicates:  No Known Allergies     Home Meds:   Prior to Admission medications    Medication Sig Start Date End Date Taking? Authorizing Provider   bexarotene 75 mg Cap Take by mouth. 3/6/25   Provider, Historical       Anticoagulation/Antiplatelet Meds: no anticoagulation    Review of Systems:   Hematological: no known coagulopathies  Respiratory: no shortness of breath  Cardiovascular: no chest pain  Gastrointestinal: no abdominal pain  Genitourinary: no dysuria  Musculoskeletal: negative  Neurological: no TIA or stroke symptoms     Physical Exam:       General: WNWD, NAD  HEENT: Normocephalic, sclera anicteric,   Neck: Supple  Heart: RRR  Lungs:  breathing unlabored  Abd: NTND, soft  Extremities:  no CCE on exposed skin  Neuro: Gross nonfocal    Laboratory:  No results found for: "INR", "PT", "PTT"    Lab Results   Component Value Date    WBC 8.00 08/22/2024    HGB 12.2 08/22/2024    HCT 38.1 08/22/2024    MCV 97 08/22/2024     08/22/2024      Lab Results   Component Value Date    GLU 93 08/22/2024     08/22/2024    K 4.2 08/22/2024     08/22/2024    CO2 29 08/22/2024    BUN 13 08/22/2024    CREATININE 0.9 08/22/2024    CALCIUM 9.4 08/22/2024    ALT 7 (L) 08/22/2024    AST 14 08/22/2024    ALBUMIN 3.8 08/22/2024    BILITOT 0.7 08/22/2024       Imaging:  US was reviewed.    Assessment/Plan:  49 y.o. female with mutlinodular thyoid. Will undergo 2 " thyroid nodule FNA of the lower right nodules today.    Sedation:  local    Risks (including, but not limited to, pain, bleeding, infection, damage to nearby structures, treatment failure/recurrence, and the need for additional procedures), potential benefits, and alternatives were discussed with the patient. All questions were answered to the best of my abilities. The patient wishes to proceed. Written informed consent was obtained.      Eden Richardson MD

## 2025-06-25 NOTE — BRIEF OP NOTE
Radiology Post-Procedure Note    Pre Op Diagnosis: thyroid nodule    Post Op Diagnosis: same    Procedure: thyroid nodule biopsy     Procedure performed by: Eden Richardson MD    Written Informed Consent Obtained: Yes    Specimen Removed: YES     Estimated Blood Loss: Minimal    Findings:   Right lower pole thryoid nodule biopsy x 2.     Patient tolerated procedure well.    Eden Richardson MD  Interventional Radiology

## 2025-06-27 LAB
ESTROGEN SERPL-MCNC: ABNORMAL PG/ML
ESTROGEN SERPL-MCNC: ABNORMAL PG/ML
INSULIN SERPL-ACNC: ABNORMAL U[IU]/ML
INSULIN SERPL-ACNC: ABNORMAL U[IU]/ML
LAB AP CLINICAL INFORMATION: ABNORMAL
LAB AP CLINICAL INFORMATION: ABNORMAL
LAB AP GROSS DESCRIPTION: ABNORMAL
LAB AP GROSS DESCRIPTION: ABNORMAL
LAB AP NON-GYN INTERPRETATION SPECIMEN 1: ABNORMAL
LAB AP NON-GYN INTERPRETATION SPECIMEN 1: ABNORMAL
LAB AP NON-GYN INTERPRETATION SPECIMEN 2: ABNORMAL
LAB AP NON-GYN INTERPRETATION SPECIMEN 2: ABNORMAL
LAB AP PATHOLOGIST ADEQUACY INTERP: ABNORMAL
LAB AP PATHOLOGIST ADEQUACY INTERP: ABNORMAL
LAB AP PERFORMING LOCATION(S): ABNORMAL
LAB AP PERFORMING LOCATION(S): ABNORMAL

## 2025-07-15 ENCOUNTER — PATIENT MESSAGE (OUTPATIENT)
Dept: OTOLARYNGOLOGY | Facility: CLINIC | Age: 50
End: 2025-07-15
Payer: COMMERCIAL

## 2025-08-21 ENCOUNTER — TELEPHONE (OUTPATIENT)
Dept: HEMATOLOGY/ONCOLOGY | Facility: CLINIC | Age: 50
End: 2025-08-21
Payer: COMMERCIAL

## (undated) DEVICE — TRAY SKIN SCRUB WET PREMIUM

## (undated) DEVICE — STAPLER SKIN PROXIMATE WIDE

## (undated) DEVICE — NDL HYPO REG 25G X 1 1/2

## (undated) DEVICE — FIBRILLAR ABS HEMOSTAT 4X4

## (undated) DEVICE — GAUZE SPONGE PEANUT STRL

## (undated) DEVICE — SUT LIGACLIP SMALL XTRA

## (undated) DEVICE — SHEARS HARMONIC CRVD 9 CM

## (undated) DEVICE — TOWEL OR DISP STRL BLUE 4/PK

## (undated) DEVICE — RETRACTOR LONE STAR 14.1X14.1

## (undated) DEVICE — ADHESIVE DERMABOND ADVANCED

## (undated) DEVICE — SUT VICRYL PLUS 3-0 SH 18IN

## (undated) DEVICE — TRAY MINOR GEN SURG OMC

## (undated) DEVICE — GOWN SURGICAL X-LARGE

## (undated) DEVICE — CONTAINER SPECIMEN STRL 4OZ

## (undated) DEVICE — CORD BIPOLAR 12 FOOT

## (undated) DEVICE — SUT 3-0 12-18IN SILK

## (undated) DEVICE — ELECTRODE REM PLYHSV RETURN 9

## (undated) DEVICE — SKINMARKER & RULER REGULAR X-F

## (undated) DEVICE — ELECTRODE BLADE INSULATED 1 IN

## (undated) DEVICE — SEE MEDLINE ITEM 157194

## (undated) DEVICE — DRAIN CHANNEL ROUND 10FR

## (undated) DEVICE — SUT 2-0 12-18IN SILK

## (undated) DEVICE — DRAPE HALF SURGICAL 40X58IN

## (undated) DEVICE — SUT COATED VICRYL 4/0 27IN

## (undated) DEVICE — EVACUATOR WOUND BULB 100CC

## (undated) DEVICE — BLADE SURG #15 CARBON STEEL

## (undated) DEVICE — SUT 2/0 30IN SILK BLK BRAI

## (undated) DEVICE — CLIP MED TICALL

## (undated) DEVICE — DRESSING TRANS 4X4 TEGADERM

## (undated) DEVICE — DRAPE EENT SPLIT STERILE